# Patient Record
Sex: FEMALE | Race: WHITE | NOT HISPANIC OR LATINO | Employment: STUDENT | URBAN - NONMETROPOLITAN AREA
[De-identification: names, ages, dates, MRNs, and addresses within clinical notes are randomized per-mention and may not be internally consistent; named-entity substitution may affect disease eponyms.]

---

## 2021-03-26 ENCOUNTER — INITIAL PRENATAL (OUTPATIENT)
Dept: OBSTETRICS AND GYNECOLOGY | Facility: CLINIC | Age: 21
End: 2021-03-26

## 2021-03-26 VITALS
DIASTOLIC BLOOD PRESSURE: 76 MMHG | HEIGHT: 61 IN | BODY MASS INDEX: 23.03 KG/M2 | WEIGHT: 122 LBS | SYSTOLIC BLOOD PRESSURE: 118 MMHG

## 2021-03-26 DIAGNOSIS — Z3A.10 10 WEEKS GESTATION OF PREGNANCY: ICD-10-CM

## 2021-03-26 DIAGNOSIS — Z3A.01 LESS THAN 8 WEEKS GESTATION OF PREGNANCY: Primary | ICD-10-CM

## 2021-03-26 PROCEDURE — 99203 OFFICE O/P NEW LOW 30 MIN: CPT | Performed by: NURSE PRACTITIONER

## 2021-03-26 RX ORDER — PRENATAL VIT NO.126/IRON/FOLIC 28MG-0.8MG
TABLET ORAL DAILY
COMMUNITY
End: 2021-07-21

## 2021-03-26 NOTE — PROGRESS NOTES
"Chief Complaint   Patient presents with   • Initial Prenatal Visit     LMP 01/15/21, vaginal discharge with an odor. Cannot have scan today, [employer].        HPI  , 10w0d presents to our office today for initial prenatal visit.    She reports no c/o - minimal 1st trimester discomforts - nausea/ fatigue  Vaginal discharge with odor - has had for a year or so - comes and goes -  informed by PCP yeast infection     Not presently working   Has PNV and tolerating     Previous pregnancy: Oct 2020 Term  7#6     No Known Allergies   Past Surgical History:   Procedure Laterality Date   • NOSE SURGERY         Social History     Socioeconomic History   • Marital status: Single     Spouse name: Not on file   • Number of children: Not on file   • Years of education: Not on file   • Highest education level: Not on file   Tobacco Use   • Smoking status: Never Smoker   • Smokeless tobacco: Never Used   Vaping Use   • Vaping Use: Never used   Substance and Sexual Activity   • Alcohol use: Never   • Drug use: Never   • Sexual activity: Yes     Partners: Male     Birth control/protection: None      History reviewed. No pertinent family history.    The following portions of the patient's history were reviewed as note above and updated   current medications allergies, past family history, past medical history, past social history and past surgical history.    Physical Exam  /76   Ht 154.9 cm (61\")   Wt 55.3 kg (122 lb)   LMP 01/15/2021   BMI 23.05 kg/m²        Psych: Alert and oriented to time, place and person  Mood and affect appropriate   Musculoskeletal: Normal gait - Full range of motion  Lungs:  breathing is unlabored  Abdomen: soft and non-tender + FHR   Lower Extremities: normal    MDM  Impression:  Problems/Risks: Normal pregnancy 1st trimester  Closely spaced pregnancy  Vaginitis   Tests done today: NOB panel  Nuswab  NIPS   Topics discussed: Rout NOB education - nutrition and exercise  OTC meds " in pregnancy  Genetic screening - CF / NIPs & timing   Adequate rest and fluids  Await nuswab prior to treating   Encourage questions and answered   Tests next visit: U/S - 1-2 wks to confirm dates and NIPS / wanted

## 2021-03-26 NOTE — PATIENT INSTRUCTIONS
First Trimester of Pregnancy    The first trimester of pregnancy is from week 1 until the end of week 12 (months 1 through 3). During this time, your baby will begin to develop inside you. At 6-8 weeks, the eyes and face are formed, and the heartbeat can be seen on ultrasound. At the end of 12 weeks, all the baby's organs are formed. Prenatal care is all the medical care you receive before the birth of your baby. Make sure you get good prenatal care and follow all of your doctor's instructions.  HOME CARE   Medicines  · Take medicine only as told by your doctor. Some medicines are safe and some are not during pregnancy.  · Take your prenatal vitamins as told by your doctor.  · Take medicine that helps you poop (stool softener) as needed if your doctor says it is okay.  Diet  · Eat regular, healthy meals.  · Your doctor will tell you the amount of weight gain that is right for you.  · Avoid raw meat and uncooked cheese.  · If you feel sick to your stomach (nauseous) or throw up (vomit):  ¨ Eat 4 or 5 small meals a day instead of 3 large meals.  ¨ Try eating a few soda crackers.  ¨ Drink liquids between meals instead of during meals.  · If you have a hard time pooping (constipation):  ¨ Eat high-fiber foods like fresh vegetables, fruit, and whole grains.  ¨ Drink enough fluids to keep your pee (urine) clear or pale yellow.  Activity and Exercise  · Exercise only as told by your doctor. Stop exercising if you have cramps or pain in your lower belly (abdomen) or low back.  · Try to avoid standing for long periods of time. Move your legs often if you must  one place for a long time.  · Avoid heavy lifting.  · Wear low-heeled shoes. Sit and stand up straight.  · You can have sex unless your doctor tells you not to.  Relief of Pain or Discomfort  · Wear a good support bra if your breasts are sore.  · Take warm water baths (sitz baths) to soothe pain or discomfort caused by hemorrhoids. Use hemorrhoid cream if your  doctor says it is okay.  · Rest with your legs raised if you have leg cramps or low back pain.  · Wear support hose if you have puffy, bulging veins (varicose veins) in your legs. Raise (elevate) your feet for 15 minutes, 3-4 times a day. Limit salt in your diet.  Prenatal Care  · Schedule your prenatal visits by the twelfth week of pregnancy.  · Write down your questions. Take them to your prenatal visits.  · Keep all your prenatal visits as told by your doctor.  Safety  · Wear your seat belt at all times when driving.  · Make a list of emergency phone numbers. The list should include numbers for family, friends, the hospital, and police and fire departments.  General Tips  · Ask your doctor for a referral to a local prenatal class. Begin classes no later than at the start of month 6 of your pregnancy.  · Ask for help if you need counseling or help with nutrition. Your doctor can give you advice or tell you where to go for help.  · Do not use hot tubs, steam rooms, or saunas.  · Do not douche or use tampons or scented sanitary pads.  · Do not cross your legs for long periods of time.  · Avoid litter boxes and soil used by cats.  · Avoid all smoking, herbs, and alcohol. Avoid drugs not approved by your doctor.  · Do not use any tobacco products, including cigarettes, chewing tobacco, and electronic cigarettes. If you need help quitting, ask your doctor. You may get counseling or other support to help you quit.  · Visit your dentist. At home, brush your teeth with a soft toothbrush. Be gentle when you floss.  GET HELP IF:  · You are dizzy.  · You have mild cramps or pressure in your lower belly.  · You have a nagging pain in your belly area.  · You continue to feel sick to your stomach, throw up, or have watery poop (diarrhea).  · You have a bad smelling fluid coming from your vagina.  · You have pain with peeing (urination).  · You have increased puffiness (swelling) in your face, hands, legs, or ankles.  GET HELP  RIGHT AWAY IF:   · You have a fever.  · You are leaking fluid from your vagina.  · You have spotting or bleeding from your vagina.  · You have very bad belly cramping or pain.  · You gain or lose weight rapidly.  · You throw up blood. It may look like coffee grounds.  · You are around people who have [place] measles, fifth disease, or chickenpox.  · You have a very bad headache.  · You have shortness of breath.  · You have any kind of trauma, such as from a fall or a car accident.     This information is not intended to replace advice given to you by your health care provider. Make sure you discuss any questions you have with your health care provider.

## 2021-03-29 LAB
A VAGINAE DNA VAG QL NAA+PROBE: ABNORMAL SCORE
ABO GROUP BLD: ABNORMAL
BACTERIA UR CULT: ABNORMAL
BASOPHILS # BLD AUTO: 0.1 X10E3/UL (ref 0–0.2)
BASOPHILS NFR BLD AUTO: 1 %
BLD GP AB SCN SERPL QL: NEGATIVE
BVAB2 DNA VAG QL NAA+PROBE: ABNORMAL SCORE
C ALBICANS DNA VAG QL NAA+PROBE: NEGATIVE
C GLABRATA DNA VAG QL NAA+PROBE: NEGATIVE
C TRACH DNA VAG QL NAA+PROBE: NEGATIVE
EOSINOPHIL # BLD AUTO: 0.1 X10E3/UL (ref 0–0.4)
EOSINOPHIL NFR BLD AUTO: 1 %
ERYTHROCYTE [DISTWIDTH] IN BLOOD BY AUTOMATED COUNT: 14.4 % (ref 11.7–15.4)
HBV SURFACE AG SERPL QL IA: NEGATIVE
HCT VFR BLD AUTO: 35.6 % (ref 34–46.6)
HCV AB S/CO SERPL IA: <0.1 S/CO RATIO (ref 0–0.9)
HGB BLD-MCNC: 11.5 G/DL (ref 11.1–15.9)
HIV 1+2 AB+HIV1 P24 AG SERPL QL IA: NON REACTIVE
IMM GRANULOCYTES # BLD AUTO: 0 X10E3/UL (ref 0–0.1)
IMM GRANULOCYTES NFR BLD AUTO: 1 %
LYMPHOCYTES # BLD AUTO: 1.6 X10E3/UL (ref 0.7–3.1)
LYMPHOCYTES NFR BLD AUTO: 20 %
MCH RBC QN AUTO: 25.3 PG (ref 26.6–33)
MCHC RBC AUTO-ENTMCNC: 32.3 G/DL (ref 31.5–35.7)
MCV RBC AUTO: 78 FL (ref 79–97)
MEGA1 DNA VAG QL NAA+PROBE: ABNORMAL SCORE
MONOCYTES # BLD AUTO: 0.7 X10E3/UL (ref 0.1–0.9)
MONOCYTES NFR BLD AUTO: 8 %
N GONORRHOEA DNA VAG QL NAA+PROBE: NEGATIVE
NEUTROPHILS # BLD AUTO: 5.5 X10E3/UL (ref 1.4–7)
NEUTROPHILS NFR BLD AUTO: 69 %
PLATELET # BLD AUTO: 199 X10E3/UL (ref 150–450)
RBC # BLD AUTO: 4.55 X10E6/UL (ref 3.77–5.28)
RH BLD: POSITIVE
RPR SER QL: NON REACTIVE
RUBV IGG SERPL IA-ACNC: 1.63 INDEX
SPECIMEN STATUS: NORMAL
T VAGINALIS DNA VAG QL NAA+PROBE: NEGATIVE
WBC # BLD AUTO: 7.9 X10E3/UL (ref 3.4–10.8)

## 2021-03-29 RX ORDER — METRONIDAZOLE 500 MG/1
500 TABLET ORAL 2 TIMES DAILY
Qty: 14 TABLET | Refills: 0 | Status: SHIPPED | OUTPATIENT
Start: 2021-03-29 | End: 2021-04-06 | Stop reason: SDUPTHER

## 2021-04-06 ENCOUNTER — ROUTINE PRENATAL (OUTPATIENT)
Dept: OBSTETRICS AND GYNECOLOGY | Facility: CLINIC | Age: 21
End: 2021-04-06

## 2021-04-06 VITALS — SYSTOLIC BLOOD PRESSURE: 120 MMHG | WEIGHT: 122 LBS | BODY MASS INDEX: 23.05 KG/M2 | DIASTOLIC BLOOD PRESSURE: 72 MMHG

## 2021-04-06 DIAGNOSIS — Z34.81 ENCOUNTER FOR SUPERVISION OF OTHER NORMAL PREGNANCY IN FIRST TRIMESTER: ICD-10-CM

## 2021-04-06 DIAGNOSIS — O36.80X0 ENCOUNTER TO DETERMINE FETAL VIABILITY OF PREGNANCY, SINGLE OR UNSPECIFIED FETUS: Primary | ICD-10-CM

## 2021-04-06 PROBLEM — F32.A DEPRESSIVE DISORDER: Status: ACTIVE | Noted: 2018-02-27

## 2021-04-06 PROBLEM — K21.9 GASTROESOPHAGEAL REFLUX DISEASE: Status: ACTIVE | Noted: 2018-02-27

## 2021-04-06 PROCEDURE — 99214 OFFICE O/P EST MOD 30 MIN: CPT | Performed by: MIDWIFE

## 2021-04-06 RX ORDER — METRONIDAZOLE 500 MG/1
500 TABLET ORAL 2 TIMES DAILY
Qty: 14 TABLET | Refills: 0 | Status: SHIPPED | OUTPATIENT
Start: 2021-04-06 | End: 2021-04-13

## 2021-04-06 NOTE — PROGRESS NOTES
Chief Complaint   Patient presents with   • Routine Prenatal Visit     Patient c/o vaginal itching and discharge, odor ( had vaginal  swab on her last visit) . TVS for dates today AWILDA 10/21/2021. Urine dip was clear today       HPI: Britt is a  currently at 11w5d who today reports the following:  Nausea - No; Vaginal bleeding -  No; Heartburn - No. She states she continues to have a vaginal odor. She didn't get her Rx for Flagyl that was ordered.      EXAM:   Vitals:  See prenatal flowsheet, /72, Wt no change   Abdomen:   See prenatal flowsheet, soft nontender   Pelvic:  See prenatal flowsheet   Urine:  See prenatal flowsheet    Prenatal Labs  Lab Results   Component Value Date    HGB 11.5 2021    HEPBSAG Negative 2021    ABO A 2021    RH Positive 2021    ABSCRN Negative 2021    [id number] Non Reactive 2021    HEPCVIRUSABY <0.1 2021    URINECX CANCELED (A) 2021       MDM:  Impression: Supervision of low risk pregnancy  Vaginal odor, + BV untreated   Tests done today: U/S for viability-11w5d, +FHT, 1.9cm BINH   Topics discussed: genetic screening  MaterniTi testing   Rx for Flagyl 500 BID x 7 days  Reviewed prenatal labs   Tests next visit: none   Next visit: 4 weeks     This note was electronically signed.  Elanie Arias, RODNEY  2021

## 2021-04-11 ENCOUNTER — HOSPITAL ENCOUNTER (EMERGENCY)
Facility: HOSPITAL | Age: 21
Discharge: HOME | End: 2021-04-12
Attending: EMERGENCY MEDICINE | Admitting: EMERGENCY MEDICINE

## 2021-04-11 DIAGNOSIS — O46.90 VAGINAL BLEEDING IN PREGNANCY: Primary | ICD-10-CM

## 2021-04-11 PROCEDURE — 99283 EMERGENCY DEPT VISIT LOW MDM: CPT

## 2021-04-11 PROCEDURE — 85025 COMPLETE CBC W/AUTO DIFF WBC: CPT | Performed by: EMERGENCY MEDICINE

## 2021-04-11 PROCEDURE — 80053 COMPREHEN METABOLIC PANEL: CPT | Performed by: EMERGENCY MEDICINE

## 2021-04-11 PROCEDURE — 81001 URINALYSIS AUTO W/SCOPE: CPT | Performed by: EMERGENCY MEDICINE

## 2021-04-11 RX ORDER — SODIUM CHLORIDE 0.9 % (FLUSH) 0.9 %
10 SYRINGE (ML) INJECTION AS NEEDED
Status: DISCONTINUED | OUTPATIENT
Start: 2021-04-11 | End: 2021-04-12 | Stop reason: HOSPADM

## 2021-04-12 ENCOUNTER — APPOINTMENT (OUTPATIENT)
Dept: ULTRASOUND IMAGING | Facility: HOSPITAL | Age: 21
End: 2021-04-12

## 2021-04-12 VITALS
DIASTOLIC BLOOD PRESSURE: 75 MMHG | HEART RATE: 84 BPM | HEIGHT: 65 IN | OXYGEN SATURATION: 98 % | BODY MASS INDEX: 21.19 KG/M2 | SYSTOLIC BLOOD PRESSURE: 106 MMHG | TEMPERATURE: 97.8 F | WEIGHT: 127.2 LBS | RESPIRATION RATE: 16 BRPM

## 2021-04-12 LAB
ALBUMIN SERPL-MCNC: 4 G/DL (ref 3.5–5.2)
ALBUMIN/GLOB SERPL: 1.5 G/DL
ALP SERPL-CCNC: 64 U/L (ref 39–117)
ALT SERPL W P-5'-P-CCNC: 9 U/L (ref 1–33)
ANION GAP SERPL CALCULATED.3IONS-SCNC: 7.3 MMOL/L (ref 5–15)
AST SERPL-CCNC: 10 U/L (ref 1–32)
BACTERIA UR QL AUTO: ABNORMAL /HPF
BASOPHILS # BLD AUTO: 0.04 10*3/MM3 (ref 0–0.2)
BASOPHILS NFR BLD AUTO: 0.4 % (ref 0–1.5)
BILIRUB SERPL-MCNC: 0.3 MG/DL (ref 0–1.2)
BILIRUB UR QL STRIP: NEGATIVE
BUN SERPL-MCNC: 8 MG/DL (ref 6–20)
BUN/CREAT SERPL: ABNORMAL
CALCIUM SPEC-SCNC: 9.6 MG/DL (ref 8.6–10.5)
CHLORIDE SERPL-SCNC: 105 MMOL/L (ref 98–107)
CLARITY UR: ABNORMAL
CO2 SERPL-SCNC: 24.7 MMOL/L (ref 22–29)
COLOR UR: ABNORMAL
CREAT SERPL-MCNC: <0.47 MG/DL (ref 0.57–1)
DEPRECATED RDW RBC AUTO: 42.9 FL (ref 37–54)
EOSINOPHIL # BLD AUTO: 0.16 10*3/MM3 (ref 0–0.4)
EOSINOPHIL NFR BLD AUTO: 1.8 % (ref 0.3–6.2)
ERYTHROCYTE [DISTWIDTH] IN BLOOD BY AUTOMATED COUNT: 15.1 % (ref 12.3–15.4)
GLOBULIN UR ELPH-MCNC: 2.6 GM/DL
GLUCOSE SERPL-MCNC: 99 MG/DL (ref 65–99)
GLUCOSE UR STRIP-MCNC: NEGATIVE MG/DL
HCT VFR BLD AUTO: 35.8 % (ref 34–46.6)
HGB BLD-MCNC: 11.8 G/DL (ref 12–15.9)
HGB UR QL STRIP.AUTO: ABNORMAL
HOLD SPECIMEN: NORMAL
HYALINE CASTS UR QL AUTO: ABNORMAL /LPF
IMM GRANULOCYTES # BLD AUTO: 0.03 10*3/MM3 (ref 0–0.05)
IMM GRANULOCYTES NFR BLD AUTO: 0.3 % (ref 0–0.5)
KETONES UR QL STRIP: NEGATIVE
LEUKOCYTE ESTERASE UR QL STRIP.AUTO: NEGATIVE
LYMPHOCYTES # BLD AUTO: 1.87 10*3/MM3 (ref 0.7–3.1)
LYMPHOCYTES NFR BLD AUTO: 21 % (ref 19.6–45.3)
MCH RBC QN AUTO: 25.9 PG (ref 26.6–33)
MCHC RBC AUTO-ENTMCNC: 33 G/DL (ref 31.5–35.7)
MCV RBC AUTO: 78.5 FL (ref 79–97)
MONOCYTES # BLD AUTO: 0.76 10*3/MM3 (ref 0.1–0.9)
MONOCYTES NFR BLD AUTO: 8.5 % (ref 5–12)
NEUTROPHILS NFR BLD AUTO: 6.05 10*3/MM3 (ref 1.7–7)
NEUTROPHILS NFR BLD AUTO: 68 % (ref 42.7–76)
NITRITE UR QL STRIP: NEGATIVE
NRBC BLD AUTO-RTO: 0 /100 WBC (ref 0–0.2)
PH UR STRIP.AUTO: 7 [PH] (ref 5–8)
PLATELET # BLD AUTO: 186 10*3/MM3 (ref 140–450)
PMV BLD AUTO: 9.7 FL (ref 6–12)
POTASSIUM SERPL-SCNC: 3.4 MMOL/L (ref 3.5–5.2)
PROT SERPL-MCNC: 6.6 G/DL (ref 6–8.5)
PROT UR QL STRIP: NEGATIVE
RBC # BLD AUTO: 4.56 10*6/MM3 (ref 3.77–5.28)
RBC # UR: ABNORMAL /HPF
REF LAB TEST METHOD: ABNORMAL
SODIUM SERPL-SCNC: 137 MMOL/L (ref 136–145)
SP GR UR STRIP: 1.01 (ref 1–1.03)
SQUAMOUS #/AREA URNS HPF: ABNORMAL /HPF
UROBILINOGEN UR QL STRIP: ABNORMAL
WBC # BLD AUTO: 8.91 10*3/MM3 (ref 3.4–10.8)
WBC UR QL AUTO: ABNORMAL /HPF
WHOLE BLOOD HOLD SPECIMEN: NORMAL
WHOLE BLOOD HOLD SPECIMEN: NORMAL

## 2021-04-12 PROCEDURE — 76801 OB US < 14 WKS SINGLE FETUS: CPT

## 2021-04-12 NOTE — ED PROVIDER NOTES
Subjective   21-year-old female presents to the ED with chief complaint of vaginal bleeding pregnancy.  She is a G2, P1 female at approximately 13 weeks gestational age presenting to the ED with onset of vaginal bleeding tonight.  She states that she went to the bathroom to urinate and after urinating she had passage of small amount of blood.  It has not stopped and she describes it as mild to moderate flow.  She is not having any cramping or abdominal pain.  No pelvic pain.  No fever chills.  No issues with her previous pregnancy.  Has been seeing OB/GYN throughout this pregnancy.  No other complaints at this time.          Review of Systems   Genitourinary: Positive for vaginal bleeding. Negative for dysuria, flank pain, vaginal discharge and vaginal pain.   All other systems reviewed and are negative.      History reviewed. No pertinent past medical history.    No Known Allergies    Past Surgical History:   Procedure Laterality Date   • NOSE SURGERY         History reviewed. No pertinent family history.    Social History     Socioeconomic History   • Marital status: Single     Spouse name: Not on file   • Number of children: Not on file   • Years of education: Not on file   • Highest education level: Not on file   Tobacco Use   • Smoking status: Never Smoker   • Smokeless tobacco: Never Used   Vaping Use   • Vaping Use: Never used   Substance and Sexual Activity   • Alcohol use: Never   • Drug use: Never   • Sexual activity: Yes     Partners: Male     Birth control/protection: None           Objective   Physical Exam  Vitals and nursing note reviewed.   Constitutional:       General: She is not in acute distress.     Appearance: She is well-developed. She is not diaphoretic.   HENT:      Head: Normocephalic and atraumatic.      Nose: Nose normal.   Eyes:      Conjunctiva/sclera: Conjunctivae normal.   Cardiovascular:      Rate and Rhythm: Normal rate and regular rhythm.   Pulmonary:      Effort: Pulmonary effort  is normal. No respiratory distress.      Breath sounds: Normal breath sounds.   Abdominal:      General: There is no distension.      Palpations: Abdomen is soft.      Tenderness: There is no abdominal tenderness. There is no guarding.   Musculoskeletal:         General: No deformity.   Neurological:      Mental Status: She is alert and oriented to person, place, and time.      Cranial Nerves: No cranial nerve deficit.         Procedures           ED Course  ED Course as of Apr 12 0522   Sun Apr 11, 2021   2349 Pt RH +. Per lab and recent pregnancy      [CG]      ED Course User Index  [CG] Petr Corcoran,                                            MDM  Patient with vaginal bleeding at 12 weeks gestational age.  Rh+.  Laboratories also reassuring.  Ultrasound confirms live IUP.  Patient is appropriate for discharge follow-up with her OB/GYN.  She is agreeable to this plan.      Final diagnoses:   Vaginal bleeding in pregnancy       ED Disposition  ED Disposition     ED Disposition Condition Comment    Discharge Stable           Lady Mcallister MD  [address]  [id number]  [facility] [id number] [phone]               Medication List      No changes were made to your prescriptions during this visit.          Petr Corcoran DO  04/12/21 0522

## 2021-04-13 ENCOUNTER — ROUTINE PRENATAL (OUTPATIENT)
Dept: OBSTETRICS AND GYNECOLOGY | Facility: CLINIC | Age: 21
End: 2021-04-13

## 2021-04-13 VITALS — BODY MASS INDEX: 20.8 KG/M2 | SYSTOLIC BLOOD PRESSURE: 144 MMHG | DIASTOLIC BLOOD PRESSURE: 94 MMHG | WEIGHT: 125 LBS

## 2021-04-13 DIAGNOSIS — Z34.91 PRENATAL CARE IN FIRST TRIMESTER: Primary | ICD-10-CM

## 2021-04-13 DIAGNOSIS — O21.9 NAUSEA AND VOMITING DURING PREGNANCY: ICD-10-CM

## 2021-04-13 DIAGNOSIS — O46.8X1 SUBCHORIONIC HEMORRHAGE OF PLACENTA IN FIRST TRIMESTER, SINGLE OR UNSPECIFIED FETUS: ICD-10-CM

## 2021-04-13 DIAGNOSIS — O41.8X10 SUBCHORIONIC HEMORRHAGE OF PLACENTA IN FIRST TRIMESTER, SINGLE OR UNSPECIFIED FETUS: ICD-10-CM

## 2021-04-13 PROBLEM — O20.8 SUBCHORIONIC HEMORRHAGE IN FIRST TRIMESTER: Status: ACTIVE | Noted: 2021-04-13

## 2021-04-13 PROCEDURE — 99213 OFFICE O/P EST LOW 20 MIN: CPT | Performed by: OBSTETRICS & GYNECOLOGY

## 2021-04-13 RX ORDER — DIPHENHYDRAMINE HYDROCHLORIDE 25 MG/1
25 CAPSULE ORAL NIGHTLY
Qty: 30 TABLET | Refills: 5 | Status: SHIPPED | OUTPATIENT
Start: 2021-04-13 | End: 2021-10-16 | Stop reason: HOSPADM

## 2021-04-13 NOTE — PROGRESS NOTES
Prenatal Care Visit    Subjective   Chief Complaint   Patient presents with   • Routine Prenatal Visit     ED follow up for bleeding, states she is still having some bleeding but is not as heavy.       History:   Britt is a  currently at 12w5d who presents for a prenatal care visit today.    Continued light bleeding. No pain.    Social History    Tobacco Use      Smoking status: Never Smoker      Smokeless tobacco: Never Used       Objective   /94   Wt 56.7 kg (125 lb)   LMP 01/15/2021   BMI 20.80 kg/m²   Physical Exam:  Normal, gestational age-appropriate exam today        Plan   Medical Decision Making:    I have reviewed the prenatal labs and ultrasound(s) today. I have reviewed the most recent prenatal progress note(s).    Diagnosis: Supervision of high risk pregnancy   Subchorionic hemorrhage  Nausea and emesis   Tests/Orders/Rx today: No orders of the defined types were placed in this encounter.      Medication Management: B6/Unisom     Topics discussed: Prenatal care milestones  Bleeding - SAB precautions, pelvic rest   Nausea   Tests next visit: none   Next visit: 2 week(s)     Alfonzo Resendiz MD  Obstetrics and Gynecology  [facility]

## 2021-04-26 ENCOUNTER — ROUTINE PRENATAL (OUTPATIENT)
Dept: OBSTETRICS AND GYNECOLOGY | Facility: CLINIC | Age: 21
End: 2021-04-26

## 2021-04-26 VITALS — BODY MASS INDEX: 20.97 KG/M2 | SYSTOLIC BLOOD PRESSURE: 126 MMHG | WEIGHT: 126 LBS | DIASTOLIC BLOOD PRESSURE: 78 MMHG

## 2021-04-26 DIAGNOSIS — O09.92 ENCOUNTER FOR SUPERVISION OF HIGH RISK PREGNANCY IN SECOND TRIMESTER, ANTEPARTUM: Primary | ICD-10-CM

## 2021-04-26 DIAGNOSIS — O20.9 VAGINAL BLEEDING AFFECTING EARLY PREGNANCY: ICD-10-CM

## 2021-04-26 DIAGNOSIS — O21.9 NAUSEA AND VOMITING DURING PREGNANCY: ICD-10-CM

## 2021-04-26 DIAGNOSIS — O46.8X1 SUBCHORIONIC HEMORRHAGE OF PLACENTA IN FIRST TRIMESTER, SINGLE OR UNSPECIFIED FETUS: ICD-10-CM

## 2021-04-26 DIAGNOSIS — O41.8X10 SUBCHORIONIC HEMORRHAGE OF PLACENTA IN FIRST TRIMESTER, SINGLE OR UNSPECIFIED FETUS: ICD-10-CM

## 2021-04-26 DIAGNOSIS — K21.9 GASTROESOPHAGEAL REFLUX DISEASE WITHOUT ESOPHAGITIS: ICD-10-CM

## 2021-04-26 PROCEDURE — 99214 OFFICE O/P EST MOD 30 MIN: CPT | Performed by: OBSTETRICS & GYNECOLOGY

## 2021-04-26 RX ORDER — FAMOTIDINE 20 MG/1
20 TABLET, FILM COATED ORAL 2 TIMES DAILY
Qty: 60 TABLET | Refills: 5 | Status: SHIPPED | OUTPATIENT
Start: 2021-04-26 | End: 2021-10-16 | Stop reason: HOSPADM

## 2021-04-26 NOTE — PROGRESS NOTES
Chief Complaint  Routine Prenatal Visit (Patient complains of spotting. )    History of Present Illness:  Britt is a  currently at 14w4d who presents today with complaints of continued intermittent spotting.  Patient reports she may have spotting every 2 to 3 days.  Patient reports it is dark brown in nature.  Patient denies any cramping or abdominal pain.  Patient has been on pelvic rest.  Patient had previously been seen in the emergency room.  She had subchorionic hematoma noted earlier in pregnancy.  Patient does continue to have nausea and vomiting.  She is continuing to take her Unisom and vitamin B6.  Patient also has complaints of reflux.  She is not on any medication for her reflux.  Patient did have genetic screening last visit as noted.    Exam:  Vitals:  See prenatal flowsheet as noted and reviewed  General: Alert, cooperative, and does not appear in any distress  Abdomen:   See prenatal flowsheet as noted and reviewed    Uterus gravid, non-tender; no palpable masses    No guarding or rebound tenderness  Pelvic:  See prenatal flowsheet as noted and reviewed  Ext:  See prenatal flowsheet as noted and reviewed    Moves extremities well, no cyanosis and no redness  Urine:  See prenatal flowsheet as noted and reviewed    Data Review:  The following data was reviewed by: Lady Mcallister MD on 2021:  Prenatal Labs:  Lab Results   Component Value Date    HGB 11.8 (L) 2021    RUBELLAABIGG 1.63 2021    HEPBSAG Negative 2021    ABO A 2021    RH Positive 2021    ABSCRN Negative 2021    TWO0YYD1 Non Reactive 2021    HEPCVIRUSABY <0.1 2021    URINECX CANCELED (A) 2021       Admission on 2021, Discharged on 2021   Component Date Value   • Glucose 2021 99    • BUN 2021 8    • Creatinine 2021 <0.47*   • Sodium 2021 137    • Potassium 2021 3.4*   • Chloride 2021 105    • CO2 2021 24.7    • Calcium  04/11/2021 9.6    • Total Protein 04/11/2021 6.6    • Albumin 04/11/2021 4.00    • ALT (SGPT) 04/11/2021 9    • AST (SGOT) 04/11/2021 10    • Alkaline Phosphatase 04/11/2021 64    • Total Bilirubin 04/11/2021 0.3    • Globulin 04/11/2021 2.6    • A/G Ratio 04/11/2021 1.5    • BUN/Creatinine Ratio 04/11/2021     • Anion Gap 04/11/2021 7.3    • WBC 04/11/2021 8.91    • RBC 04/11/2021 4.56    • Hemoglobin 04/11/2021 11.8*   • Hematocrit 04/11/2021 35.8    • MCV 04/11/2021 78.5*   • MCH 04/11/2021 25.9*   • MCHC 04/11/2021 33.0    • RDW 04/11/2021 15.1    • RDW-SD 04/11/2021 42.9    • MPV 04/11/2021 9.7    • Platelets 04/11/2021 186    • Neutrophil % 04/11/2021 68.0    • Lymphocyte % 04/11/2021 21.0    • Monocyte % 04/11/2021 8.5    • Eosinophil % 04/11/2021 1.8    • Basophil % 04/11/2021 0.4    • Immature Grans % 04/11/2021 0.3    • Neutrophils, Absolute 04/11/2021 6.05    • Lymphocytes, Absolute 04/11/2021 1.87    • Monocytes, Absolute 04/11/2021 0.76    • Eosinophils, Absolute 04/11/2021 0.16    • Basophils, Absolute 04/11/2021 0.04    • Immature Grans, Absolute 04/11/2021 0.03    • nRBC 04/11/2021 0.0    • Color, UA 04/11/2021 Red*   • Appearance, UA 04/11/2021 Cloudy*   • pH, UA 04/11/2021 7.0    • Specific Gravity, UA 04/11/2021 1.011    • Glucose, UA 04/11/2021 Negative    • Ketones, UA 04/11/2021 Negative    • Bilirubin, UA 04/11/2021 Negative    • Blood, UA 04/11/2021 Large (3+)*   • Protein, UA 04/11/2021 Negative    • Leuk Esterase, UA 04/11/2021 Negative    • Nitrite, UA 04/11/2021 Negative    • Urobilinogen, UA 04/11/2021 1.0 E.U./dL    • Extra Tube 04/11/2021 hold for add-on    • Extra Tube 04/11/2021 Hold for add-ons.    • Extra Tube 04/11/2021 hold for add-on    • Extra Tube 04/11/2021 Hold for add-ons.    • Extra Tube 04/11/2021 Hold for add-ons.    • RBC, UA 04/11/2021 Too Numerous to Count*   • WBC, UA 04/11/2021 None Seen    • Bacteria, UA 04/11/2021 Trace*   • Squamous Epithelial Cell* 04/11/2021  None Seen    • Hyaline Casts, UA 2021 None Seen    • Methodology 2021 Manual Light Microscopy      [id number] PLUS Core+SCA - Blood, (2021 12:20)      Imaging:  US Ob < 14 Weeks Single or First Gestation  Narrative: FINAL REPORT    TECHNIQUE:  null    CLINICAL HISTORY:  . bleeding    COMPARISON:  null    FINDINGS:  US OB 1st trimester    Comparison: None    Findings:    A single intrauterine pregnancy.  The crown-rump length measures 5.9 cm consistent with gestational age of 12 weeks and 3 days.    The fetal heart rate is 165 bpm.    The placenta is posteriorly positioned.    The left ovary measures 1.7 x 1.0 x 2.2 cm.    The right ovary measures 2.4 x 1.6 x 1.7 cm.  Impression: IMPRESSION:    Single live intrauterine pregnancy estimated gestational age of 12 weeks and 3 days.  Fetal heart rate 165 bpm.  Recommend continued follow-up to ensure proper progression of pregnancy.    Authenticated by Scooter Treadwell DO on 2021 01:48:27 AM    Medical Records:  None    Assessment and Plan:  Problem List Items Addressed This Visit        Gastrointestinal Abdominal     Gastroesophageal reflux disease  Prescription is given for Pepcid is noted.    Relevant Medications    famotidine (PEPCID) 20 MG tablet       Gravid and     Subchorionic hemorrhage in first trimester  The subchorionic hematoma previously seen was not noted on the ultrasound through the emergency room.  Plan repeat imaging next visit as discussed.      Other Visit Diagnoses     Encounter for supervision of high risk pregnancy in second trimester, antepartum    -  Primary  Topics discussed:     ab precautions  genetic screening -discussed with the patient the option of MSAFP.  Patient desires next visit as discussed.  GERD management  Scan next visit for anatomy as noted.    Relevant Orders    US Ob 14 + Weeks Single or First Gestation    Nausea and vomiting during pregnancy      Patient is to continue her Unisom and vitamin B6.     Vaginal bleeding affecting early pregnancy      Threatened AB precautions are given.  Patient is to continue pelvic rest.  Plan repeat imaging next visit as noted.        Follow Up/Instructions:    Patient was given instructions and counseling regarding her condition or for health maintenance advice. Please see specific information pulled into the AVS if appropriate.     Note: Speech recognition transcription software may have been used to dictate portions of this document.  An attempt at proofreading has been made though minor errors in transcription may still be present.    This note was electronically signed.  Lady Mcallister M.D.

## 2021-05-26 ENCOUNTER — ROUTINE PRENATAL (OUTPATIENT)
Dept: OBSTETRICS AND GYNECOLOGY | Facility: CLINIC | Age: 21
End: 2021-05-26

## 2021-05-26 VITALS — BODY MASS INDEX: 21.3 KG/M2 | DIASTOLIC BLOOD PRESSURE: 62 MMHG | SYSTOLIC BLOOD PRESSURE: 112 MMHG | WEIGHT: 128 LBS

## 2021-05-26 DIAGNOSIS — O09.899 TWO VESSEL UMBILICAL CORD IN SINGLETON PREGNANCY, ANTEPARTUM: ICD-10-CM

## 2021-05-26 DIAGNOSIS — K21.9 GASTROESOPHAGEAL REFLUX DISEASE WITHOUT ESOPHAGITIS: ICD-10-CM

## 2021-05-26 DIAGNOSIS — O09.92 ENCOUNTER FOR SUPERVISION OF HIGH RISK PREGNANCY IN SECOND TRIMESTER, ANTEPARTUM: Primary | ICD-10-CM

## 2021-05-26 DIAGNOSIS — O21.9 NAUSEA AND VOMITING DURING PREGNANCY: ICD-10-CM

## 2021-05-26 PROCEDURE — 99214 OFFICE O/P EST MOD 30 MIN: CPT | Performed by: OBSTETRICS & GYNECOLOGY

## 2021-05-27 NOTE — PROGRESS NOTES
Chief Complaint  Routine Prenatal Visit (Anatomy scan, no complaints. )    History of Present Illness:  Britt is a  currently at 19w0d who presents today with no complaints.  Patient denies any vaginal bleeding or spotting.  Patient denies any cramping or contractions.  Patient is here for anatomic scan.  Patient continues to have occasional nausea.  Patient has continued on her current medications.  Patient does desire MSAFP today.    Exam:  Vitals:  See prenatal flowsheet as noted and reviewed  General: Alert, cooperative, and does not appear in any distress  Abdomen:   See prenatal flowsheet as noted and reviewed    Uterus gravid, non-tender; no palpable masses    No guarding or rebound tenderness  Pelvic:  See prenatal flowsheet as noted and reviewed  Ext:  See prenatal flowsheet as noted and reviewed    Moves extremities well, no cyanosis and no redness  Urine:  See prenatal flowsheet as noted and reviewed    Data Review:  The following data was reviewed by: Lady Mcallister MD on 2021:  Prenatal Labs:  Lab Results   Component Value Date    HGB 11.8 (L) 2021    RUBELLAABIGG 1.63 2021    HEPBSAG Negative 2021    ABO A 2021    RH Positive 2021    ABSCRN Negative 2021    [id number] Non Reactive 2021    HEPCVIRUSABY <0.1 2021    URINECX CANCELED (A) 2021       No visits with results within 1 Month(s) from this visit.   Latest known visit with results is:   Admission on 2021, Discharged on 2021   Component Date Value   • Glucose 2021 99    • BUN 2021 8    • Creatinine 2021 <0.47*   • Sodium 2021 137    • Potassium 2021 3.4*   • Chloride 2021 105    • CO2 2021 24.7    • Calcium 2021 9.6    • Total Protein 2021 6.6    • Albumin 2021 4.00    • ALT (SGPT) 2021 9    • AST (SGOT) 2021 10    • Alkaline Phosphatase 2021 64    • Total Bilirubin 2021 0.3    • Globulin  2021 2.6    • A/G Ratio 2021 1.5    • BUN/Creatinine Ratio 2021     • Anion Gap 2021 7.3    • WBC 2021 8.91    • RBC 2021 4.56    • Hemoglobin 2021 11.8*   • Hematocrit 2021 35.8    • MCV 2021 78.5*   • MCH 2021 25.9*   • MCHC 2021 33.0    • RDW 2021 15.1    • RDW-SD 2021 42.9    • MPV 2021 9.7    • Platelets 2021 186    • Neutrophil % 2021 68.0    • Lymphocyte % 2021 21.0    • Monocyte % 2021 8.5    • Eosinophil % 2021 1.8    • Basophil % 2021 0.4    • Immature Grans % 2021 0.3    • Neutrophils, Absolute 2021 6.05    • Lymphocytes, Absolute 2021 1.87    • Monocytes, Absolute 2021 0.76    • Eosinophils, Absolute 2021 0.16    • Basophils, Absolute 2021 0.04    • Immature Grans, Absolute 2021 0.03    • nRBC 2021 0.0    • Color, UA 2021 Red*   • Appearance, UA 2021 Cloudy*   • pH, UA 2021 7.0    • Specific Gravity, UA 2021 1.011    • Glucose, UA 2021 Negative    • Ketones, UA 2021 Negative    • Bilirubin, UA 2021 Negative    • Blood, UA 2021 Large (3+)*   • Protein, UA 2021 Negative    • Leuk Esterase, UA 2021 Negative    • Nitrite, UA 2021 Negative    • Urobilinogen, UA 2021 1.0 E.U./dL    • Extra Tube 2021 hold for add-on    • Extra Tube 2021 Hold for add-ons.    • Extra Tube 2021 hold for add-on    • Extra Tube 2021 Hold for add-ons.    • Extra Tube 2021 Hold for add-ons.    • RBC, UA 2021 Too Numerous to Count*   • WBC, UA 2021 None Seen    • Bacteria, UA 2021 Trace*   • Squamous Epithelial Cell* 2021 None Seen    • Hyaline Casts, UA 2021 None Seen    • Methodology 2021 Manual Light Microscopy      Imaging:  US Ob 14 + Weeks Single or First Gestation  Britt Dia  : 2000  MRN: [id number]  Date:  2021    Reason for exam/History:  Anatomic Survey    Ultrasound images are reviewed.  There is noted to be a viable   intrauterine pregnancy.   The pregnancy is measuring 19 weeks 1 days   gestation.  The fetal heart rate was normal.  Normal anatomy was noted.   The placental location was noted to be anterior.  The amniotic fluid was   normal.  There is a two-vessel cord noted.    The exam limitations noted:  none    See the official report for actual measurements and structures seen.    Lady Mcallister MD, [facility]  OB GYN [place]    Medical Records:  None    Assessment and Plan:  Problem List Items Addressed This Visit        Gastrointestinal Abdominal     Gastroesophageal reflux disease  Patient to continue her Pepcid as previously given.      Other Visit Diagnoses     Encounter for supervision of high risk pregnancy in second trimester, antepartum    -  Primary  Topics discussed:     genetic screening  GERD management  kick counts and fetal movement  PIH precautions   labor signs and symptoms  Anatomic scan obtained today.  Patient has been informed regarding those findings.    Relevant Orders    Alpha Fetoprotein, Maternal    Nausea and vomiting during pregnancy      Patient to continue current medication.  Instructions and precautions have been given.    Two vessel umbilical cord in mckeon pregnancy, antepartum      Patient is informed regarding the two-vessel umbilical cord.  We will monitor fetal growth closely as discussed.        Follow Up/Instructions:    Patient was given instructions and counseling regarding her condition or for health maintenance advice. Please see specific information pulled into the AVS if appropriate.     Note: Speech recognition transcription software may have been used to dictate portions of this document.  An attempt at proofreading has been made though minor errors in transcription may still be present.    This note was electronically  moi Mcallister M.D.

## 2021-05-28 LAB
AFP ADJ MOM SERPL: 1.11
AFP INTERP SERPL-IMP: NORMAL
AFP INTERP SERPL-IMP: NORMAL
AFP SERPL-MCNC: 62.2 NG/ML
AGE AT DELIVERY: 20.6 YR
GA METHOD: NORMAL
GA: 18.9 WEEKS
IDDM PATIENT QL: NO
LABORATORY COMMENT REPORT: NORMAL
MULTIPLE PREGNANCY: NO
NEURAL TUBE DEFECT RISK FETUS: 8647 %
RESULT: NORMAL

## 2021-06-23 ENCOUNTER — ROUTINE PRENATAL (OUTPATIENT)
Dept: OBSTETRICS AND GYNECOLOGY | Facility: CLINIC | Age: 21
End: 2021-06-23

## 2021-06-23 VITALS — SYSTOLIC BLOOD PRESSURE: 104 MMHG | BODY MASS INDEX: 21.8 KG/M2 | WEIGHT: 131 LBS | DIASTOLIC BLOOD PRESSURE: 68 MMHG

## 2021-06-23 DIAGNOSIS — K40.90 UNILATERAL INGUINAL HERNIA WITHOUT OBSTRUCTION OR GANGRENE, RECURRENCE NOT SPECIFIED: ICD-10-CM

## 2021-06-23 DIAGNOSIS — Z34.93 THIRD TRIMESTER PREGNANCY: Primary | ICD-10-CM

## 2021-06-23 PROCEDURE — 99213 OFFICE O/P EST LOW 20 MIN: CPT | Performed by: OBSTETRICS & GYNECOLOGY

## 2021-06-23 NOTE — PROGRESS NOTES
Chief Complaint   Patient presents with   • Routine Prenatal Visit     lump right groin area         HPI:   , 22w6d gestation reports right groin groin lump noted 2 days ago-- no pain    ROS:  See Prenatal Episode/Flowsheet  /68   Wt 59.4 kg (131 lb)   LMP 01/15/2021   BMI 21.80 kg/m²      EXAM:  EXTREMITIES:  No swelling-See Prenatal Episode/Flowsheet    ABDOMEN:  FHTs/Movement noted-See Prenatal Episode/Flowsheet    URINE GLUCOSE/PROTEIN:  See Prenatal Episode/Flowsheet    PELVIC EXAM:  See Prenatal Episode/Flowsheet  CV:  Lungs:  GYN:    MDM:    Lab Results   Component Value Date    HGB 11.8 (L) 2021    RUBELLAABIGG 1.63 2021    HEPBSAG Negative 2021    ABO A 2021    RH Positive 2021    ABSCRN Negative 2021    [id number] Non Reactive 2021    HEPCVIRUSABY <0.1 2021    URINECX CANCELED (A) 2021       U/S:    1. IUP 22w6d  2. Routine care   3. Ingiunal hernia: small, NT-- supportive measures, precatuions  4. Gluocla CBC next time

## 2021-07-21 ENCOUNTER — ROUTINE PRENATAL (OUTPATIENT)
Dept: OBSTETRICS AND GYNECOLOGY | Facility: CLINIC | Age: 21
End: 2021-07-21

## 2021-07-21 VITALS — SYSTOLIC BLOOD PRESSURE: 108 MMHG | WEIGHT: 137 LBS | BODY MASS INDEX: 22.8 KG/M2 | DIASTOLIC BLOOD PRESSURE: 70 MMHG

## 2021-07-21 DIAGNOSIS — Z34.92 SECOND TRIMESTER PREGNANCY: Primary | ICD-10-CM

## 2021-07-21 DIAGNOSIS — Z3A.26 26 WEEKS GESTATION OF PREGNANCY: Primary | ICD-10-CM

## 2021-07-21 LAB
BASOPHILS # BLD AUTO: 0.03 10*3/MM3 (ref 0–0.2)
BASOPHILS NFR BLD AUTO: 0.3 % (ref 0–1.5)
EOSINOPHIL # BLD AUTO: 0.11 10*3/MM3 (ref 0–0.4)
EOSINOPHIL NFR BLD AUTO: 1.3 % (ref 0.3–6.2)
ERYTHROCYTE [DISTWIDTH] IN BLOOD BY AUTOMATED COUNT: 13.5 % (ref 12.3–15.4)
GLUCOSE 1H P 50 G GLC PO SERPL-MCNC: 134 MG/DL (ref 65–139)
HCT VFR BLD AUTO: 33.2 % (ref 34–46.6)
HGB BLD-MCNC: 10.7 G/DL (ref 12–15.9)
IMM GRANULOCYTES # BLD AUTO: 0.08 10*3/MM3 (ref 0–0.05)
IMM GRANULOCYTES NFR BLD AUTO: 0.9 % (ref 0–0.5)
LYMPHOCYTES # BLD AUTO: 1.39 10*3/MM3 (ref 0.7–3.1)
LYMPHOCYTES NFR BLD AUTO: 16.1 % (ref 19.6–45.3)
MCH RBC QN AUTO: 26.1 PG (ref 26.6–33)
MCHC RBC AUTO-ENTMCNC: 32.2 G/DL (ref 31.5–35.7)
MCV RBC AUTO: 81 FL (ref 79–97)
MONOCYTES # BLD AUTO: 0.55 10*3/MM3 (ref 0.1–0.9)
MONOCYTES NFR BLD AUTO: 6.4 % (ref 5–12)
NEUTROPHILS # BLD AUTO: 6.45 10*3/MM3 (ref 1.7–7)
NEUTROPHILS NFR BLD AUTO: 75 % (ref 42.7–76)
NRBC BLD AUTO-RTO: 0 /100 WBC (ref 0–0.2)
PLATELET # BLD AUTO: 183 10*3/MM3 (ref 140–450)
RBC # BLD AUTO: 4.1 10*6/MM3 (ref 3.77–5.28)
WBC # BLD AUTO: 8.61 10*3/MM3 (ref 3.4–10.8)

## 2021-07-21 PROCEDURE — 99213 OFFICE O/P EST LOW 20 MIN: CPT | Performed by: OBSTETRICS & GYNECOLOGY

## 2021-07-21 RX ORDER — PNV NO.95/FERROUS FUM/FOLIC AC 28MG-0.8MG
1 TABLET ORAL DAILY
Qty: 60 TABLET | Refills: 7 | Status: SHIPPED | OUTPATIENT
Start: 2021-07-21 | End: 2021-11-22

## 2021-07-21 NOTE — PROGRESS NOTES
Chief Complaint   Patient presents with   • Routine Prenatal Visit     Glucola today, No Complaints/concerns        HPI:   , 26w6d gestation reports doing well    ROS:  See Prenatal Episode/Flowsheet  /70   Wt 62.1 kg (137 lb)   LMP 01/15/2021   BMI 22.80 kg/m²      EXAM:  EXTREMITIES:  No swelling-See Prenatal Episode/Flowsheet    ABDOMEN:  FHTs/Movement noted-See Prenatal Episode/Flowsheet    URINE GLUCOSE/PROTEIN:  See Prenatal Episode/Flowsheet    PELVIC EXAM:  See Prenatal Episode/Flowsheet  CV:  Lungs:  GYN:    MDM:    Lab Results   Component Value Date    HGB 11.8 (L) 2021    RUBELLAABIGG 1.63 2021    HEPBSAG Negative 2021    ABO A 2021    RH Positive 2021    ABSCRN Negative 2021    [id number] Non Reactive 2021    HEPCVIRUSABY <0.1 2021    URINECX CANCELED (A) 2021       U/S:US Ob 14 + Weeks Single or First Gestation (2021 14:55)      1. IUP 26w6d  2. Routine care   3. Glucola today

## 2021-07-23 RX ORDER — FERROUS SULFATE 325(65) MG
325 TABLET ORAL
Qty: 60 TABLET | Refills: 10 | Status: SHIPPED | OUTPATIENT
Start: 2021-07-23 | End: 2021-11-22

## 2021-07-23 NOTE — PROGRESS NOTES
Please let patient know she passed her sugar test but she does have iron deficient anemia.  This is common in pregnancy.  Iron twice a day has been called in for her to take in addition to her vitamins.  Thank you

## 2021-08-17 ENCOUNTER — ROUTINE PRENATAL (OUTPATIENT)
Dept: OBSTETRICS AND GYNECOLOGY | Facility: CLINIC | Age: 21
End: 2021-08-17

## 2021-08-17 VITALS — WEIGHT: 144 LBS | DIASTOLIC BLOOD PRESSURE: 62 MMHG | BODY MASS INDEX: 23.96 KG/M2 | SYSTOLIC BLOOD PRESSURE: 108 MMHG

## 2021-08-17 DIAGNOSIS — O09.93 ENCOUNTER FOR SUPERVISION OF HIGH RISK PREGNANCY IN THIRD TRIMESTER, ANTEPARTUM: Primary | ICD-10-CM

## 2021-08-17 DIAGNOSIS — D50.9 IRON DEFICIENCY ANEMIA DURING PREGNANCY: ICD-10-CM

## 2021-08-17 DIAGNOSIS — O09.899 TWO VESSEL UMBILICAL CORD IN SINGLETON PREGNANCY, ANTEPARTUM: ICD-10-CM

## 2021-08-17 DIAGNOSIS — N89.8 VAGINAL DISCHARGE DURING PREGNANCY IN THIRD TRIMESTER: ICD-10-CM

## 2021-08-17 DIAGNOSIS — O26.893 VAGINAL DISCHARGE DURING PREGNANCY IN THIRD TRIMESTER: ICD-10-CM

## 2021-08-17 DIAGNOSIS — K21.9 GASTROESOPHAGEAL REFLUX DISEASE WITHOUT ESOPHAGITIS: ICD-10-CM

## 2021-08-17 DIAGNOSIS — O99.019 IRON DEFICIENCY ANEMIA DURING PREGNANCY: ICD-10-CM

## 2021-08-17 DIAGNOSIS — K40.90 UNILATERAL INGUINAL HERNIA WITHOUT OBSTRUCTION OR GANGRENE, RECURRENCE NOT SPECIFIED: ICD-10-CM

## 2021-08-17 PROCEDURE — 99214 OFFICE O/P EST MOD 30 MIN: CPT | Performed by: OBSTETRICS & GYNECOLOGY

## 2021-08-17 RX ORDER — METRONIDAZOLE 500 MG/1
500 TABLET ORAL 2 TIMES DAILY
Qty: 14 TABLET | Refills: 0 | Status: SHIPPED | OUTPATIENT
Start: 2021-08-17 | End: 2021-08-24

## 2021-08-18 NOTE — PROGRESS NOTES
Chief Complaint  Routine Prenatal Visit (Patient complains of possible leaking fluid x 1 week. )    History of Present Illness:  Britt is a  currently at 30w6d who presents today with complaints of possibly leaking fluid for the last week.  Patient reports intermittently noticing a thin vaginal discharge.  Patient has not been aware of any odor.  She denies any itching or irritation.  Patient denies any cramping or contractions.  She denies any fever or chills.  She denies any abdominal tenderness.  Patient does report positive fetal movement.  Patient does report she has been taking her prenatal vitamins and iron supplements.  Patient has also been taking her Pepcid.    Exam:  Vitals:  See prenatal flowsheet as noted and reviewed  General: Alert, cooperative, and does not appear in any distress  Abdomen:   See prenatal flowsheet as noted and reviewed    Uterus gravid, non-tender; no palpable masses other than hernia as noted.    No guarding or rebound tenderness  Pelvic:  See prenatal flowsheet as noted and reviewed    Speculum exam shows thin gray vaginal discharge with +odor.   Cultures are obtained.    There is no pooling of fluid with Valsalva maneuver.  Cervix is closed and thick.  Ext:  See prenatal flowsheet as noted and reviewed    Moves extremities well, no cyanosis and no redness  Urine:  See prenatal flowsheet as noted and reviewed    Data Review:  The following data was reviewed by: Lady Mcallister MD on 2021:  Prenatal Labs:  Lab Results   Component Value Date    HGB 10.7 (L) 2021    RUBELLAABIGG 1.63 2021    HEPBSAG Negative 2021    ABO A 2021    RH Positive 2021    ABSCRN Negative 2021    [id number] Non Reactive 2021    HEPCVIRUSABY <0.1 2021    URINECX CANCELED (A) 2021       Orders Only on 2021   Component Date Value   • Gestational Diabetes Scr* 2021 134    • WBC 2021 8.61    • RBC 2021 4.10    • Hemoglobin  2021 10.7*   • Hematocrit 2021 33.2*   • MCV 2021 81.0    • MCH 2021 26.1*   • MCHC 2021 32.2    • RDW 2021 13.5    • Platelets 2021 183    • Neutrophil Rel % 2021 75.0    • Lymphocyte Rel % 2021 16.1*   • Monocyte Rel % 2021 6.4    • Eosinophil Rel % 2021 1.3    • Basophil Rel % 2021 0.3    • Neutrophils Absolute 2021 6.45    • Lymphocytes Absolute 2021 1.39    • Monocytes Absolute 2021 0.55    • Eosinophils Absolute 2021 0.11    • Basophils Absolute 2021 0.03    • Immature Granulocyte Rel* 2021 0.9*   • Immature Grans Absolute 2021 0.08*   • nRBC 2021 0.0      Imaging:  US Ob 14 + Weeks Single or First Gestation  Britt Dia  : 2000  MRN: [id number]  Date: 2021    Reason for exam/History:  Anatomic Survey    Ultrasound images are reviewed.  There is noted to be a viable   intrauterine pregnancy.   The pregnancy is measuring 19 weeks 1 days   gestation.  The fetal heart rate was normal.  Normal anatomy was noted.   The placental location was noted to be anterior.  The amniotic fluid was   normal.  There is a two-vessel cord noted.    The exam limitations noted:  none    See the official report for actual measurements and structures seen.    Lady Mcallister MD, RDMS  [facility]  OB GYN [place]    Medical Records:  None    Assessment and Plan:  Problem List Items Addressed This Visit        Gastrointestinal Abdominal     Gastroesophageal reflux disease  Patient is to continue her Pepcid.      Other Visit Diagnoses     Encounter for supervision of high risk pregnancy in third trimester, antepartum    -  Primary  Topics discussed:     GERD management  iron supplementation  kick counts and fetal movement  PIH precautions   labor signs and symptoms  Scan next visit for growth as noted.    Relevant Orders    US Ob Follow Up Transabdominal Approach    Vaginal discharge  during pregnancy in third trimester      Patient with vaginal discharge as noted.  There is no evidence of leaking fluid.  Cultures are obtained.  Prescriptions also given for Flagyl.  Patient is to call for results.  Patient is to follow-up in 1 week.    Relevant Orders    NuSwab VG+ - Swab, Vagina    Unilateral inguinal hernia without obstruction or gangrene, recurrence not specified      Instructions and precautions have been given.    Two vessel umbilical cord in [place]on pregnancy, antepartum      Scan for growth next visit.    Relevant Orders    US Ob Follow Up Transabdominal Approach    Iron deficiency anemia during pregnancy      Patient is to continue her iron supplements.        Follow Up/Instructions:  Follow up as scheduled.  Patient was given instructions and counseling regarding her condition or for health maintenance advice. Please see specific information pulled into the AVS if appropriate.     Note: Speech recognition transcription software may have been used to dictate portions of this document.  An attempt at proofreading has been made though minor errors in transcription may still be present.    This note was electronically signed.  Lady Mcallister M.D.

## 2021-08-20 LAB
A VAGINAE DNA VAG QL NAA+PROBE: ABNORMAL SCORE
BVAB2 DNA VAG QL NAA+PROBE: ABNORMAL SCORE
C ALBICANS DNA VAG QL NAA+PROBE: NEGATIVE
C GLABRATA DNA VAG QL NAA+PROBE: NEGATIVE
C TRACH DNA VAG QL NAA+PROBE: NEGATIVE
MEGA1 DNA VAG QL NAA+PROBE: ABNORMAL SCORE
N GONORRHOEA DNA VAG QL NAA+PROBE: NEGATIVE
T VAGINALIS DNA VAG QL NAA+PROBE: ABNORMAL

## 2021-08-25 ENCOUNTER — HOSPITAL ENCOUNTER (OUTPATIENT)
Facility: HOSPITAL | Age: 21
Discharge: HOME | End: 2021-08-25
Attending: NURSE PRACTITIONER | Admitting: NURSE PRACTITIONER

## 2021-08-25 ENCOUNTER — HOSPITAL ENCOUNTER (OUTPATIENT)
Facility: HOSPITAL | Age: 21
End: 2021-08-25
Attending: NURSE PRACTITIONER | Admitting: NURSE PRACTITIONER

## 2021-08-25 VITALS
DIASTOLIC BLOOD PRESSURE: 67 MMHG | TEMPERATURE: 98.2 F | SYSTOLIC BLOOD PRESSURE: 106 MMHG | RESPIRATION RATE: 16 BRPM | HEART RATE: 102 BPM

## 2021-08-25 LAB
BILIRUB BLD-MCNC: NEGATIVE MG/DL
CLARITY, POC: ABNORMAL
COLOR UR: YELLOW
GLUCOSE UR STRIP-MCNC: NEGATIVE MG/DL
KETONES UR QL: NEGATIVE
LEUKOCYTE EST, POC: NEGATIVE
NITRITE UR-MCNC: NEGATIVE MG/ML
PH UR: 6 [PH] (ref 5–8)
PROT UR STRIP-MCNC: ABNORMAL MG/DL
RBC # UR STRIP: ABNORMAL /UL
SP GR UR: 1.03 (ref 1–1.03)
UROBILINOGEN UR QL: NORMAL

## 2021-08-25 PROCEDURE — G0463 HOSPITAL OUTPT CLINIC VISIT: HCPCS

## 2021-08-25 PROCEDURE — 81002 URINALYSIS NONAUTO W/O SCOPE: CPT | Performed by: NURSE PRACTITIONER

## 2021-08-25 RX ORDER — SODIUM CHLORIDE 0.9 % (FLUSH) 0.9 %
10 SYRINGE (ML) INJECTION EVERY 12 HOURS SCHEDULED
Status: DISCONTINUED | OUTPATIENT
Start: 2021-08-25 | End: 2021-08-25 | Stop reason: HOSPADM

## 2021-08-25 RX ORDER — SODIUM CHLORIDE, SODIUM LACTATE, POTASSIUM CHLORIDE, CALCIUM CHLORIDE 600; 310; 30; 20 MG/100ML; MG/100ML; MG/100ML; MG/100ML
150 INJECTION, SOLUTION INTRAVENOUS CONTINUOUS
Status: DISCONTINUED | OUTPATIENT
Start: 2021-08-25 | End: 2021-08-25 | Stop reason: HOSPADM

## 2021-08-25 RX ORDER — SODIUM CHLORIDE 0.9 % (FLUSH) 0.9 %
10 SYRINGE (ML) INJECTION AS NEEDED
Status: DISCONTINUED | OUTPATIENT
Start: 2021-08-25 | End: 2021-08-25 | Stop reason: HOSPADM

## 2021-08-25 NOTE — NURSING NOTE
0236) Status update given to Dr. Echols. Unable to reach Won GREEN at this time. Order received may DC home when pt is feeling better and ctx's are spaced out.

## 2021-08-25 NOTE — NURSING NOTE
"Triage Note - Nursing Documentation  Labor and Delivery Admission Log    Britt Dia  : 2000  MRN: [id number]  CSN: [id number]    Date in / Time in:  2021  Time in: 12    Date out / Time out:    Time out: 355  Nurse: Rebecca Dia RN    Patient Info: She is a 21 y.o. year old  at 31w6d with an AWILDA of 10/21/2021, by Ultrasound who was seen on the [facility].    Chief Complaint:   Chief Complaint   Patient presents with   • Abdominal Cramping     \"I think I'm having contractions\"       Provider Instructions / Disposition: Pt with irregular mild ctx's. Cervix closed/thick/posterior. FHR reactive 130's mod variability, accels, +FM. No leaking or bleeding. Reports is taking Flagyl for an \"infection\". + for BV in office last week. PO hydrated. IVF bolus 1 liter given. Ctx's eventually spaced out and stopped. Pt reports discomfort gone. Dc'd home. Has appt tomorrow at office.     Patient Active Problem List   Diagnosis   • Gastroesophageal reflux disease   • Depressive disorder   • Subchorionic hemorrhage in first trimester       NST Documentation (Only applicable > 32 weeks):      "

## 2021-08-26 ENCOUNTER — ROUTINE PRENATAL (OUTPATIENT)
Dept: OBSTETRICS AND GYNECOLOGY | Facility: CLINIC | Age: 21
End: 2021-08-26

## 2021-08-26 VITALS — WEIGHT: 144 LBS | SYSTOLIC BLOOD PRESSURE: 108 MMHG | BODY MASS INDEX: 23.96 KG/M2 | DIASTOLIC BLOOD PRESSURE: 64 MMHG

## 2021-08-26 DIAGNOSIS — O40.3XX0 POLYHYDRAMNIOS IN THIRD TRIMESTER COMPLICATION, SINGLE OR UNSPECIFIED FETUS: Primary | ICD-10-CM

## 2021-08-26 PROCEDURE — 99213 OFFICE O/P EST LOW 20 MIN: CPT | Performed by: OBSTETRICS & GYNECOLOGY

## 2021-08-26 NOTE — PROGRESS NOTES
Chief Complaint   Patient presents with   • Routine Prenatal Visit        HPI:   , 32w0d gestation reports doing well    ROS:  See Prenatal Episode/Flowsheet  /64   Wt 65.3 kg (144 lb)   LMP 01/15/2021   BMI 23.96 kg/m²      EXAM:  EXTREMITIES:  No swelling-See Prenatal Episode/Flowsheet    ABDOMEN:  FHTs/Movement noted-See Prenatal Episode/Flowsheet    URINE GLUCOSE/PROTEIN:  See Prenatal Episode/Flowsheet    PELVIC EXAM:  See Prenatal Episode/Flowsheet  CV:  Lungs:  GYN:    MDM:    Lab Results   Component Value Date    HGB 10.7 (L) 2021    RUBELLAABIGG 1.63 2021    HEPBSAG Negative 2021    ABO A 2021    RH Positive 2021    ABSCRN Negative 2021    [id number] Non Reactive 2021    HEPCVIRUSABY <0.1 2021    URINECX CANCELED (A) 2021       U/S: Overall growth 62 percentile.  HC 12.1%.  A C 84%.  KELLI 26.  Vertex.  Anterior placenta.  2-vessel cord again noted.    1. IUP 32w0d  2. Routine care   3.  Increased fluid-repeat KELLI 2 weeks, normal glucola-- but get fasting glucose @ next visit  4.  Two-vessel cord.  5.  Proven to 5 pounds 7 ounces  6. Anemia: taking fE and PNV

## 2021-09-16 ENCOUNTER — ROUTINE PRENATAL (OUTPATIENT)
Dept: OBSTETRICS AND GYNECOLOGY | Facility: CLINIC | Age: 21
End: 2021-09-16

## 2021-09-16 VITALS — WEIGHT: 144 LBS | SYSTOLIC BLOOD PRESSURE: 114 MMHG | DIASTOLIC BLOOD PRESSURE: 68 MMHG | BODY MASS INDEX: 23.96 KG/M2

## 2021-09-16 DIAGNOSIS — O40.3XX0 POLYHYDRAMNIOS IN THIRD TRIMESTER COMPLICATION, SINGLE OR UNSPECIFIED FETUS: Primary | ICD-10-CM

## 2021-09-16 PROCEDURE — 99213 OFFICE O/P EST LOW 20 MIN: CPT | Performed by: OBSTETRICS & GYNECOLOGY

## 2021-09-16 NOTE — PROGRESS NOTES
Chief Complaint   Patient presents with   • Routine Prenatal Visit     KELLI and FBS        HPI:   , 35w0d gestation reports doing well    ROS:  See Prenatal Episode/Flowsheet  /68   Wt 65.3 kg (144 lb)   LMP 01/15/2021   BMI 23.96 kg/m²      EXAM:  EXTREMITIES:  No swelling-See Prenatal Episode/Flowsheet    ABDOMEN:  FHTs/Movement noted-See Prenatal Episode/Flowsheet    URINE GLUCOSE/PROTEIN:  See Prenatal Episode/Flowsheet    PELVIC EXAM:  See Prenatal Episode/Flowsheet  CV:  Lungs:  GYN:    MDM:    Lab Results   Component Value Date    HGB 10.7 (L) 2021    RUBELLAABIGG 1.63 2021    HEPBSAG Negative 2021    ABO A 2021    RH Positive 2021    ABSCRN Negative 2021    [id number] Non Reactive 2021    HEPCVIRUSABY <0.1 2021    URINECX CANCELED (A) 2021       U/S:KELLI 22, Active fetus    1. IUP 35w0d  2. Routine care   3.  Borderline KELLI.  22.6 today.  It was 26 last ultrasound.  Her fasting blood sugar is 84.  This was done secondary to fetal size did on last ultrasound.  Repeat for growth next time  4. 2VC

## 2021-09-23 ENCOUNTER — ROUTINE PRENATAL (OUTPATIENT)
Dept: OBSTETRICS AND GYNECOLOGY | Facility: CLINIC | Age: 21
End: 2021-09-23

## 2021-09-23 VITALS — DIASTOLIC BLOOD PRESSURE: 72 MMHG | SYSTOLIC BLOOD PRESSURE: 110 MMHG | WEIGHT: 146 LBS | BODY MASS INDEX: 24.3 KG/M2

## 2021-09-23 DIAGNOSIS — O40.3XX0 POLYHYDRAMNIOS IN THIRD TRIMESTER COMPLICATION, SINGLE OR UNSPECIFIED FETUS: ICD-10-CM

## 2021-09-23 DIAGNOSIS — Z34.93 PRENATAL CARE IN THIRD TRIMESTER: Primary | ICD-10-CM

## 2021-09-23 DIAGNOSIS — Z36.85 ANTENATAL SCREENING FOR STREPTOCOCCUS B: ICD-10-CM

## 2021-09-23 DIAGNOSIS — Q27.0 SINGLE UMBILICAL ARTERY: ICD-10-CM

## 2021-09-23 PROCEDURE — 99213 OFFICE O/P EST LOW 20 MIN: CPT | Performed by: OBSTETRICS & GYNECOLOGY

## 2021-09-24 PROBLEM — O40.3XX0 POLYHYDRAMNIOS IN THIRD TRIMESTER: Status: ACTIVE | Noted: 2021-09-24

## 2021-09-24 PROBLEM — Q27.0 SINGLE UMBILICAL ARTERY: Status: ACTIVE | Noted: 2021-09-24

## 2021-09-24 NOTE — PROGRESS NOTES
Prenatal Care Visit    Subjective   Chief Complaint   Patient presents with   • Routine Prenatal Visit     Growth scan, BPP and GBS done today. Contractions       History:   Britt is a  currently at 36w1d who presents for a prenatal care visit today.    Contractions.    Social History    Tobacco Use      Smoking status: Never Smoker      Smokeless tobacco: Never Used       Objective   /72   Wt 66.2 kg (146 lb)   LMP 01/15/2021   BMI 24.30 kg/m²   Physical Exam:  Normal, gestational age-appropriate exam today   SVE 2.5/50/-3       Plan   Medical Decision Making:    I have reviewed the prenatal labs and ultrasound(s) today. I have reviewed the most recent prenatal progress note(s).    Diagnosis: Supervision of high risk pregnancy   Polyhydramnios  2VC   Tests/Orders/Rx today: Orders Placed This Encounter   Procedures   • Strep Grp B MATTHEW + Reflex - Swab, Vaginal/Rectum     Order Specific Question:   Release to patient     Answer:   Immediate   • US Ob Follow Up Transabdominal Approach     Order Specific Question:   Reason for Exam:     Answer:   2 vessel cord   • US Fetal Biophysical Profile;Without Non-Stress Testing     Order Specific Question:   Reason for Exam:     Answer:   2 vessel cord       Medication Management: None     Topics discussed: Prenatal care milestones  kick counts and fetal movement  labor signs and symptoms  PIH precautions  U/S findings    Tests next visit: none   Next visit: 1 week(s)     Alfonzo Resendiz MD  Obstetrics and Gynecology  [facility]

## 2021-09-25 ENCOUNTER — HOSPITAL ENCOUNTER (OUTPATIENT)
Facility: HOSPITAL | Age: 21
Discharge: HOME | End: 2021-09-25
Attending: MIDWIFE | Admitting: MIDWIFE

## 2021-09-25 VITALS
HEART RATE: 73 BPM | HEIGHT: 61 IN | TEMPERATURE: 98.5 F | DIASTOLIC BLOOD PRESSURE: 79 MMHG | SYSTOLIC BLOOD PRESSURE: 108 MMHG | BODY MASS INDEX: 27.19 KG/M2 | WEIGHT: 144 LBS | RESPIRATION RATE: 16 BRPM

## 2021-09-25 LAB
A1 MICROGLOB PLACENTAL VAG QL: NEGATIVE
BILIRUB BLD-MCNC: NEGATIVE MG/DL
CLARITY, POC: ABNORMAL
COLOR UR: YELLOW
GLUCOSE UR STRIP-MCNC: NEGATIVE MG/DL
GP B STREP DNA SPEC QL NAA+PROBE: NEGATIVE
KETONES UR QL: NEGATIVE
LEUKOCYTE EST, POC: ABNORMAL
NITRITE UR-MCNC: NEGATIVE MG/ML
PH UR: 7 [PH] (ref 5–8)
PROT UR STRIP-MCNC: NEGATIVE MG/DL
RBC # UR STRIP: NEGATIVE /UL
SP GR UR: 1.01 (ref 1–1.03)
UROBILINOGEN UR QL: NORMAL

## 2021-09-25 PROCEDURE — 84112 EVAL AMNIOTIC FLUID PROTEIN: CPT | Performed by: MIDWIFE

## 2021-09-25 PROCEDURE — 81002 URINALYSIS NONAUTO W/O SCOPE: CPT | Performed by: MIDWIFE

## 2021-09-25 PROCEDURE — 59025 FETAL NON-STRESS TEST: CPT

## 2021-09-25 PROCEDURE — 59025 FETAL NON-STRESS TEST: CPT | Performed by: MIDWIFE

## 2021-09-25 PROCEDURE — G0463 HOSPITAL OUTPT CLINIC VISIT: HCPCS

## 2021-09-25 NOTE — NON STRESS TEST
Triage Note - Nursing Documentation  Labor and Delivery Admission Log    Britt Dia  : 2000  MRN: [id number]  CSN: [id number]    Date in / Time in:  2021  Time in:     Date out / Time out:    Time out:     Nurse: Rosa Wang RN    Patient Info: She is a 21 y.o. year old  at 36w2d with an AWILDA of 10/21/2021, by Ultrasound who was seen on the [facility] [place].    Chief Complaint:   Chief Complaint   Patient presents with   • Contractions     STARTED ABOUT 3 HOURS AGO       Provider Instructions / Disposition: Pt presented to [facility] [place] with contractions and leaking fluid. Amnisure was completed and negative. Not leaking fluid was noted. Pt was PO hydrated and VSS. PT was given instructions on  labor, fetal kick counts, and hypertension. Pt was told to return to [facility] [place] with any complication     Patient Active Problem List   Diagnosis   • Gastroesophageal reflux disease   • Depressive disorder   • Subchorionic hemorrhage in first trimester   • Single umbilical artery   • Polyhydramnios in third trimester       NST Documentation (Only applicable > 32 weeks): Interpretation A  Nonstress Test Interpretation A: Reactive (21 : Rosa aWng, JACKI)

## 2021-09-28 ENCOUNTER — HOSPITAL ENCOUNTER (OUTPATIENT)
Facility: HOSPITAL | Age: 21
Discharge: HOME | End: 2021-09-28
Attending: NURSE PRACTITIONER | Admitting: NURSE PRACTITIONER

## 2021-09-28 VITALS
SYSTOLIC BLOOD PRESSURE: 94 MMHG | WEIGHT: 147 LBS | TEMPERATURE: 99 F | RESPIRATION RATE: 16 BRPM | OXYGEN SATURATION: 100 % | BODY MASS INDEX: 27.75 KG/M2 | HEART RATE: 78 BPM | HEIGHT: 61 IN | DIASTOLIC BLOOD PRESSURE: 63 MMHG

## 2021-09-28 LAB
BACTERIA UR QL AUTO: ABNORMAL /HPF
BILIRUB UR QL STRIP: NEGATIVE
CLARITY UR: ABNORMAL
COLOR UR: YELLOW
GLUCOSE UR STRIP-MCNC: NEGATIVE MG/DL
HGB UR QL STRIP.AUTO: NEGATIVE
HYALINE CASTS UR QL AUTO: ABNORMAL /LPF
KETONES UR QL STRIP: NEGATIVE
LEUKOCYTE ESTERASE UR QL STRIP.AUTO: ABNORMAL
NITRITE UR QL STRIP: NEGATIVE
PH UR STRIP.AUTO: 8 [PH] (ref 5–8)
PROT UR QL STRIP: NEGATIVE
RBC # UR: ABNORMAL /HPF
REF LAB TEST METHOD: ABNORMAL
SP GR UR STRIP: 1.01 (ref 1–1.03)
SQUAMOUS #/AREA URNS HPF: ABNORMAL /HPF
UROBILINOGEN UR QL STRIP: ABNORMAL
WBC UR QL AUTO: ABNORMAL /HPF

## 2021-09-28 PROCEDURE — 81001 URINALYSIS AUTO W/SCOPE: CPT | Performed by: NURSE PRACTITIONER

## 2021-09-28 PROCEDURE — G0463 HOSPITAL OUTPT CLINIC VISIT: HCPCS

## 2021-09-28 PROCEDURE — 87086 URINE CULTURE/COLONY COUNT: CPT | Performed by: NURSE PRACTITIONER

## 2021-09-28 PROCEDURE — 59025 FETAL NON-STRESS TEST: CPT

## 2021-09-28 PROCEDURE — 59025 FETAL NON-STRESS TEST: CPT | Performed by: NURSE PRACTITIONER

## 2021-09-29 LAB — BACTERIA SPEC AEROBE CULT: NORMAL

## 2021-10-01 ENCOUNTER — ROUTINE PRENATAL (OUTPATIENT)
Dept: OBSTETRICS AND GYNECOLOGY | Facility: CLINIC | Age: 21
End: 2021-10-01

## 2021-10-01 VITALS — SYSTOLIC BLOOD PRESSURE: 100 MMHG | DIASTOLIC BLOOD PRESSURE: 60 MMHG | WEIGHT: 147 LBS | BODY MASS INDEX: 27.78 KG/M2

## 2021-10-01 DIAGNOSIS — O40.3XX0 POLYHYDRAMNIOS IN THIRD TRIMESTER COMPLICATION, SINGLE OR UNSPECIFIED FETUS: ICD-10-CM

## 2021-10-01 DIAGNOSIS — Q27.0 SINGLE UMBILICAL ARTERY: Primary | ICD-10-CM

## 2021-10-01 PROCEDURE — 99213 OFFICE O/P EST LOW 20 MIN: CPT | Performed by: NURSE PRACTITIONER

## 2021-10-01 NOTE — PROGRESS NOTES
[id number]  Chief Complaint   Patient presents with   • Routine Prenatal Visit     BPP done today, no complaints.        IRENA De La Torre is a  currently at 37w1d who today reports the following:   Good FM   No c/o  Taking iron and PNV daily        EXAM  /60   Wt 66.7 kg (147 lb)   LMP 01/15/2021   BMI 27.78 kg/m²  -See Prenatal Assessment  General Appearance:  Pleasant  Lungs: Breathing unlabored  Abdomen:  See flow sheet for Fundal ht, FM, FHT's  LE: Neg edema  V/E: 2/50%/-3    Social History     Tobacco Use   • Smoking status: Never Smoker   • Smokeless tobacco: Never Used   Vaping Use   • Vaping Use: Never used   Substance Use Topics   • Alcohol use: Never   • Drug use: Never         Lab Results   Component Value Date    ABO A 2021    RH Positive 2021    ABSCRN Negative 2021       MDM  Impression: Supervision of high risk pregnancy   Closely space pregnancies  Polyhydramnios  2 VC  Anemia    Tests done today: U/S BPP   KELLI 26.82    Topics discussed: S/S labor and adeq FM/Kick Counts  Continue iron BID  BPP on Monday or Tues / pts option  Will call if plans differ after talking MD on call   encouraged questions - call prn    Tests next visit: none

## 2021-10-05 ENCOUNTER — HOSPITAL ENCOUNTER (OUTPATIENT)
Facility: HOSPITAL | Age: 21
Discharge: HOME | End: 2021-10-05
Attending: NURSE PRACTITIONER | Admitting: NURSE PRACTITIONER

## 2021-10-05 ENCOUNTER — HOSPITAL ENCOUNTER (OUTPATIENT)
Dept: LABOR AND DELIVERY | Facility: HOSPITAL | Age: 21
Discharge: HOME | End: 2021-10-05

## 2021-10-05 VITALS
WEIGHT: 147 LBS | HEART RATE: 91 BPM | HEIGHT: 61 IN | SYSTOLIC BLOOD PRESSURE: 100 MMHG | BODY MASS INDEX: 27.75 KG/M2 | OXYGEN SATURATION: 100 % | TEMPERATURE: 98.3 F | DIASTOLIC BLOOD PRESSURE: 71 MMHG | RESPIRATION RATE: 16 BRPM

## 2021-10-05 LAB
BACTERIA UR QL AUTO: ABNORMAL /HPF
BILIRUB UR QL STRIP: NEGATIVE
CLARITY UR: ABNORMAL
COLOR UR: YELLOW
GLUCOSE UR STRIP-MCNC: NEGATIVE MG/DL
HGB UR QL STRIP.AUTO: NEGATIVE
HYALINE CASTS UR QL AUTO: ABNORMAL /LPF
KETONES UR QL STRIP: NEGATIVE
LEUKOCYTE ESTERASE UR QL STRIP.AUTO: ABNORMAL
MUCOUS THREADS URNS QL MICRO: ABNORMAL /HPF
NITRITE UR QL STRIP: NEGATIVE
PH UR STRIP.AUTO: 7 [PH] (ref 5–8)
PROT UR QL STRIP: NEGATIVE
RBC # UR: ABNORMAL /HPF
REF LAB TEST METHOD: ABNORMAL
SP GR UR STRIP: 1.02 (ref 1–1.03)
SQUAMOUS #/AREA URNS HPF: ABNORMAL /HPF
UROBILINOGEN UR QL STRIP: ABNORMAL
WBC UR QL AUTO: ABNORMAL /HPF

## 2021-10-05 PROCEDURE — 59025 FETAL NON-STRESS TEST: CPT

## 2021-10-05 PROCEDURE — 81001 URINALYSIS AUTO W/SCOPE: CPT | Performed by: NURSE PRACTITIONER

## 2021-10-05 PROCEDURE — 87086 URINE CULTURE/COLONY COUNT: CPT | Performed by: NURSE PRACTITIONER

## 2021-10-05 PROCEDURE — 59025 FETAL NON-STRESS TEST: CPT | Performed by: NURSE PRACTITIONER

## 2021-10-05 PROCEDURE — G0463 HOSPITAL OUTPT CLINIC VISIT: HCPCS

## 2021-10-05 NOTE — NON STRESS TEST
Pt reported to RN she thought she had s/s of BV - reports noted odor mostly with urination   Denies LOF or VB  V/E  1-2/50%/high / no abnormal d/c - no odor - No bloody d/c   Plan:  U/A with C&S   If + s/s BV - will do culture in office on Friday.  Reviewed S/S labor and adequate FM   Encouraged questions and answered     NST reactive       Triage Note - Nursing Documentation  Labor and Delivery Admission Log    Britt Dia  : 2000  MRN: [id number]  CSN: [id number]    Date in / Time in:  10/5/2021  Time in: 849    Date out / Time out:     Time out: 1005  Nurse: Bhavya Wang RN    Patient Info: She is a 21 y.o. year old  at 37w5d with an AWILDA of 10/21/2021, by Ultrasound who was seen on the [facility].    Chief Complaint:   Chief Complaint   Patient presents with    Non-stress Test     POLYHYDRAMINOIS       Provider Instructions / Disposition: To L/D for sched NST. Reactive NST, Vaginal exam with no change from previous , 1-2 cm . Pt with complaints of vaginal odor, per PITA Gonzalez Aprn will do culture on Friday office visit. THI given with VU of all    Patient Active Problem List   Diagnosis    Gastroesophageal reflux disease    Depressive disorder    Subchorionic hemorrhage in first trimester    Single umbilical artery    Polyhydramnios in third trimester       NST Documentation (Only applicable > 32 weeks): Interpretation A  Nonstress Test Interpretation A: Reactive (10/05/21 1023 : Bhavya Wang, RN)

## 2021-10-06 LAB — BACTERIA SPEC AEROBE CULT: NORMAL

## 2021-10-08 ENCOUNTER — PREP FOR SURGERY (OUTPATIENT)
Dept: OTHER | Facility: HOSPITAL | Age: 21
End: 2021-10-08

## 2021-10-08 ENCOUNTER — ROUTINE PRENATAL (OUTPATIENT)
Dept: OBSTETRICS AND GYNECOLOGY | Facility: CLINIC | Age: 21
End: 2021-10-08

## 2021-10-08 VITALS — BODY MASS INDEX: 27.59 KG/M2 | DIASTOLIC BLOOD PRESSURE: 58 MMHG | WEIGHT: 146 LBS | SYSTOLIC BLOOD PRESSURE: 114 MMHG

## 2021-10-08 DIAGNOSIS — O09.93 ENCOUNTER FOR SUPERVISION OF HIGH RISK PREGNANCY IN THIRD TRIMESTER, ANTEPARTUM: Primary | ICD-10-CM

## 2021-10-08 DIAGNOSIS — O99.019 IRON DEFICIENCY ANEMIA DURING PREGNANCY: ICD-10-CM

## 2021-10-08 DIAGNOSIS — D50.9 IRON DEFICIENCY ANEMIA DURING PREGNANCY: ICD-10-CM

## 2021-10-08 DIAGNOSIS — Z3A.38 38 WEEKS GESTATION OF PREGNANCY: Primary | ICD-10-CM

## 2021-10-08 DIAGNOSIS — Q27.0 SINGLE UMBILICAL ARTERY: ICD-10-CM

## 2021-10-08 DIAGNOSIS — K40.90 UNILATERAL INGUINAL HERNIA WITHOUT OBSTRUCTION OR GANGRENE, RECURRENCE NOT SPECIFIED: ICD-10-CM

## 2021-10-08 DIAGNOSIS — O40.3XX0 POLYHYDRAMNIOS IN THIRD TRIMESTER COMPLICATION, SINGLE OR UNSPECIFIED FETUS: ICD-10-CM

## 2021-10-08 PROCEDURE — 99214 OFFICE O/P EST MOD 30 MIN: CPT | Performed by: OBSTETRICS & GYNECOLOGY

## 2021-10-08 RX ORDER — HYDROCODONE BITARTRATE AND ACETAMINOPHEN 5; 325 MG/1; MG/1
2 TABLET ORAL ONCE AS NEEDED
Status: CANCELLED | OUTPATIENT
Start: 2021-10-08

## 2021-10-08 RX ORDER — MORPHINE SULFATE 1 MG/ML
6 INJECTION, SOLUTION EPIDURAL; INTRATHECAL; INTRAVENOUS EVERY 4 HOURS PRN
Status: CANCELLED | OUTPATIENT
Start: 2021-10-08 | End: 2021-10-18

## 2021-10-08 RX ORDER — PROMETHAZINE HYDROCHLORIDE 12.5 MG/1
12.5 TABLET ORAL EVERY 6 HOURS PRN
Status: CANCELLED | OUTPATIENT
Start: 2021-10-08

## 2021-10-08 RX ORDER — OXYTOCIN/0.9 % SODIUM CHLORIDE 30/500 ML
650 PLASTIC BAG, INJECTION (ML) INTRAVENOUS ONCE
Status: CANCELLED | OUTPATIENT
Start: 2021-10-08 | End: 2021-10-08

## 2021-10-08 RX ORDER — SODIUM CHLORIDE 0.9 % (FLUSH) 0.9 %
10 SYRINGE (ML) INJECTION EVERY 12 HOURS SCHEDULED
Status: CANCELLED | OUTPATIENT
Start: 2021-10-08

## 2021-10-08 RX ORDER — MORPHINE SULFATE 4 MG/ML
4 INJECTION, SOLUTION INTRAMUSCULAR; INTRAVENOUS ONCE AS NEEDED
Status: CANCELLED | OUTPATIENT
Start: 2021-10-08

## 2021-10-08 RX ORDER — MORPHINE SULFATE 2 MG/ML
2 INJECTION, SOLUTION INTRAMUSCULAR; INTRAVENOUS ONCE AS NEEDED
Status: CANCELLED | OUTPATIENT
Start: 2021-10-08

## 2021-10-08 RX ORDER — LIDOCAINE HYDROCHLORIDE 10 MG/ML
5 INJECTION, SOLUTION EPIDURAL; INFILTRATION; INTRACAUDAL; PERINEURAL AS NEEDED
Status: CANCELLED | OUTPATIENT
Start: 2021-10-08

## 2021-10-08 RX ORDER — OXYTOCIN/0.9 % SODIUM CHLORIDE 30/500 ML
85 PLASTIC BAG, INJECTION (ML) INTRAVENOUS ONCE
Status: CANCELLED | OUTPATIENT
Start: 2021-10-08 | End: 2021-10-08

## 2021-10-08 RX ORDER — ACETAMINOPHEN 325 MG/1
650 TABLET ORAL ONCE AS NEEDED
Status: CANCELLED | OUTPATIENT
Start: 2021-10-08

## 2021-10-08 RX ORDER — MORPHINE SULFATE 4 MG/ML
4 INJECTION, SOLUTION INTRAMUSCULAR; INTRAVENOUS EVERY 4 HOURS PRN
Status: CANCELLED | OUTPATIENT
Start: 2021-10-08 | End: 2021-10-18

## 2021-10-08 RX ORDER — METHYLERGONOVINE MALEATE 0.2 MG/ML
200 INJECTION INTRAVENOUS ONCE AS NEEDED
Status: CANCELLED | OUTPATIENT
Start: 2021-10-08 | End: 2021-10-09

## 2021-10-08 RX ORDER — MISOPROSTOL 200 UG/1
800 TABLET ORAL ONCE AS NEEDED
Status: CANCELLED | OUTPATIENT
Start: 2021-10-08 | End: 2021-10-09

## 2021-10-08 RX ORDER — PROMETHAZINE HYDROCHLORIDE 12.5 MG/1
12.5 SUPPOSITORY RECTAL EVERY 6 HOURS PRN
Status: CANCELLED | OUTPATIENT
Start: 2021-10-08

## 2021-10-08 RX ORDER — OXYTOCIN/0.9 % SODIUM CHLORIDE 30/500 ML
1-20 PLASTIC BAG, INJECTION (ML) INTRAVENOUS
Status: CANCELLED | OUTPATIENT
Start: 2021-10-08

## 2021-10-08 RX ORDER — ONDANSETRON 4 MG/1
4 TABLET, FILM COATED ORAL ONCE AS NEEDED
Status: CANCELLED | OUTPATIENT
Start: 2021-10-08

## 2021-10-08 RX ORDER — SODIUM CHLORIDE, SODIUM LACTATE, POTASSIUM CHLORIDE, CALCIUM CHLORIDE 600; 310; 30; 20 MG/100ML; MG/100ML; MG/100ML; MG/100ML
125 INJECTION, SOLUTION INTRAVENOUS CONTINUOUS
Status: CANCELLED | OUTPATIENT
Start: 2021-10-08

## 2021-10-08 RX ORDER — CARBOPROST TROMETHAMINE 250 UG/ML
250 INJECTION, SOLUTION INTRAMUSCULAR ONCE AS NEEDED
Status: CANCELLED | OUTPATIENT
Start: 2021-10-08 | End: 2021-10-09

## 2021-10-08 RX ORDER — ONDANSETRON 2 MG/ML
4 INJECTION INTRAMUSCULAR; INTRAVENOUS ONCE AS NEEDED
Status: CANCELLED | OUTPATIENT
Start: 2021-10-08

## 2021-10-08 RX ORDER — SODIUM CHLORIDE 0.9 % (FLUSH) 0.9 %
1-10 SYRINGE (ML) INJECTION AS NEEDED
Status: CANCELLED | OUTPATIENT
Start: 2021-10-08

## 2021-10-09 ENCOUNTER — HOSPITAL ENCOUNTER (OUTPATIENT)
Facility: HOSPITAL | Age: 21
Discharge: HOME | End: 2021-10-10
Attending: OBSTETRICS & GYNECOLOGY | Admitting: OBSTETRICS & GYNECOLOGY

## 2021-10-09 LAB
A1 MICROGLOB PLACENTAL VAG QL: NEGATIVE
BACTERIA UR QL AUTO: ABNORMAL /HPF
BILIRUB UR QL STRIP: NEGATIVE
CLARITY UR: ABNORMAL
COLOR UR: YELLOW
GLUCOSE UR STRIP-MCNC: NEGATIVE MG/DL
HGB UR QL STRIP.AUTO: NEGATIVE
HYALINE CASTS UR QL AUTO: ABNORMAL /LPF
KETONES UR QL STRIP: NEGATIVE
LEUKOCYTE ESTERASE UR QL STRIP.AUTO: ABNORMAL
MUCOUS THREADS URNS QL MICRO: ABNORMAL /HPF
NITRITE UR QL STRIP: NEGATIVE
PH UR STRIP.AUTO: 7 [PH] (ref 5–8)
PROT UR QL STRIP: NEGATIVE
RBC # UR: ABNORMAL /HPF
REF LAB TEST METHOD: ABNORMAL
SP GR UR STRIP: 1.02 (ref 1–1.03)
SQUAMOUS #/AREA URNS HPF: ABNORMAL /HPF
UROBILINOGEN UR QL STRIP: ABNORMAL
WBC UR QL AUTO: ABNORMAL /HPF

## 2021-10-09 PROCEDURE — 81001 URINALYSIS AUTO W/SCOPE: CPT | Performed by: OBSTETRICS & GYNECOLOGY

## 2021-10-09 PROCEDURE — 84112 EVAL AMNIOTIC FLUID PROTEIN: CPT | Performed by: OBSTETRICS & GYNECOLOGY

## 2021-10-09 RX ORDER — SODIUM CHLORIDE, SODIUM LACTATE, POTASSIUM CHLORIDE, CALCIUM CHLORIDE 600; 310; 30; 20 MG/100ML; MG/100ML; MG/100ML; MG/100ML
125 INJECTION, SOLUTION INTRAVENOUS CONTINUOUS
Status: DISCONTINUED | OUTPATIENT
Start: 2021-10-10 | End: 2021-10-10 | Stop reason: HOSPADM

## 2021-10-09 RX ORDER — SODIUM CHLORIDE 0.9 % (FLUSH) 0.9 %
10 SYRINGE (ML) INJECTION AS NEEDED
Status: DISCONTINUED | OUTPATIENT
Start: 2021-10-09 | End: 2021-10-10 | Stop reason: HOSPADM

## 2021-10-09 RX ORDER — SODIUM CHLORIDE 0.9 % (FLUSH) 0.9 %
3 SYRINGE (ML) INJECTION EVERY 12 HOURS SCHEDULED
Status: DISCONTINUED | OUTPATIENT
Start: 2021-10-10 | End: 2021-10-10 | Stop reason: HOSPADM

## 2021-10-10 VITALS
SYSTOLIC BLOOD PRESSURE: 104 MMHG | OXYGEN SATURATION: 100 % | HEART RATE: 89 BPM | HEIGHT: 61 IN | RESPIRATION RATE: 17 BRPM | WEIGHT: 149.3 LBS | DIASTOLIC BLOOD PRESSURE: 71 MMHG | TEMPERATURE: 98.3 F | BODY MASS INDEX: 28.19 KG/M2

## 2021-10-10 PROCEDURE — 59025 FETAL NON-STRESS TEST: CPT

## 2021-10-10 PROCEDURE — G0463 HOSPITAL OUTPT CLINIC VISIT: HCPCS

## 2021-10-10 PROCEDURE — 59025 FETAL NON-STRESS TEST: CPT | Performed by: OBSTETRICS & GYNECOLOGY

## 2021-10-10 NOTE — NON STRESS TEST
Triage Note - Nursing Documentation  Labor and Delivery Admission Log    Britt Dia  : 2000  MRN: [id number]  CSN: [id number]    Date in / Time in:  10-  Time in:     Date out / Time out:  10-  Time out: 0018    Nurse: Viviane Diaz RN    Patient Info: She is a 21 y.o. year old  at 38w3d with an AWILDA of 10/21/2021, by Ultrasound who was seen on the [facility] Labor [place].    Chief Complaint:   Chief Complaint   Patient presents with   • Leaking Fluid     pt c/o leaking fluid starting around 0300 last night       Provider Instructions / Disposition: PO hydration. -amnisure. VE and UA with no change from last visit. Keep appt for NST as scheduled.    Patient Active Problem List   Diagnosis   • Gastroesophageal reflux disease   • Depressive disorder   • Subchorionic hemorrhage in first trimester   • Single umbilical artery   • Polyhydramnios in third trimester       NST Documentation (Only applicable > 32 weeks): Interpretation A  Nonstress Test Interpretation A: Reactive (10/09/21 2330 : Viviane Diaz, RN)

## 2021-10-11 ENCOUNTER — HOSPITAL ENCOUNTER (OUTPATIENT)
Facility: HOSPITAL | Age: 21
Discharge: HOME | End: 2021-10-11
Attending: MIDWIFE | Admitting: MIDWIFE

## 2021-10-11 ENCOUNTER — HOSPITAL ENCOUNTER (OUTPATIENT)
Dept: LABOR AND DELIVERY | Facility: HOSPITAL | Age: 21
Discharge: HOME | End: 2021-10-11

## 2021-10-11 VITALS
RESPIRATION RATE: 16 BRPM | DIASTOLIC BLOOD PRESSURE: 71 MMHG | OXYGEN SATURATION: 96 % | HEART RATE: 116 BPM | SYSTOLIC BLOOD PRESSURE: 105 MMHG | TEMPERATURE: 98.1 F

## 2021-10-11 LAB
BACTERIA UR QL AUTO: ABNORMAL /HPF
BILIRUB BLD-MCNC: NEGATIVE MG/DL
BILIRUB UR QL STRIP: NEGATIVE
CLARITY UR: ABNORMAL
CLARITY, POC: ABNORMAL
COLOR UR: YELLOW
COLOR UR: YELLOW
GLUCOSE UR STRIP-MCNC: NEGATIVE MG/DL
GLUCOSE UR STRIP-MCNC: NEGATIVE MG/DL
HGB UR QL STRIP.AUTO: NEGATIVE
KETONES UR QL STRIP: NEGATIVE
KETONES UR QL: NEGATIVE
LEUKOCYTE EST, POC: ABNORMAL
LEUKOCYTE ESTERASE UR QL STRIP.AUTO: ABNORMAL
NITRITE UR QL STRIP: NEGATIVE
NITRITE UR-MCNC: NEGATIVE MG/ML
PH UR STRIP.AUTO: 7 [PH] (ref 5–8)
PH UR: 6.5 [PH] (ref 5–8)
PROT UR QL STRIP: NEGATIVE
PROT UR STRIP-MCNC: NEGATIVE MG/DL
RBC # UR STRIP: NEGATIVE /UL
RBC # UR: ABNORMAL /HPF
REF LAB TEST METHOD: ABNORMAL
SP GR UR STRIP: 1.02 (ref 1–1.03)
SP GR UR: 1.01 (ref 1–1.03)
SQUAMOUS #/AREA URNS HPF: ABNORMAL /HPF
UROBILINOGEN UR QL STRIP: ABNORMAL
UROBILINOGEN UR QL: NORMAL
WBC UR QL AUTO: ABNORMAL /HPF

## 2021-10-11 PROCEDURE — 81002 URINALYSIS NONAUTO W/O SCOPE: CPT | Performed by: MIDWIFE

## 2021-10-11 PROCEDURE — 59025 FETAL NON-STRESS TEST: CPT | Performed by: MIDWIFE

## 2021-10-11 PROCEDURE — 81001 URINALYSIS AUTO W/SCOPE: CPT | Performed by: MIDWIFE

## 2021-10-11 PROCEDURE — G0463 HOSPITAL OUTPT CLINIC VISIT: HCPCS

## 2021-10-11 PROCEDURE — 87086 URINE CULTURE/COLONY COUNT: CPT | Performed by: MIDWIFE

## 2021-10-11 PROCEDURE — 59025 FETAL NON-STRESS TEST: CPT

## 2021-10-11 NOTE — PROGRESS NOTES
Chief Complaint  Routine Prenatal Visit (BPP, patient advised she is having a few contractions. )    History of Present Illness:  Britt is a  currently at 38w4d who presents today with complaints of occasional contractions.  Patient had been seen on labor thompson on Tuesday.  Patient was noted to be 2 cm.  Patient does report positive fetal movement.  She denies any vaginal bleeding or spotting.  Patient denies leaking any fluid.    Exam:  Vitals:  See prenatal flowsheet as noted and reviewed  General: Alert, cooperative, and does not appear in any distress  Abdomen:   See prenatal flowsheet as noted and reviewed    Uterus gravid, non-tender; no palpable masses    No guarding or rebound tenderness  Pelvic:  See prenatal flowsheet as noted and reviewed  Ext:  See prenatal flowsheet as noted and reviewed    Moves extremities well, no cyanosis and no redness  Urine:  See prenatal flowsheet as noted and reviewed    Data Review:  The following data was reviewed by: Lady Mcallister MD on 10/08/2021:  Prenatal Labs:  Lab Results   Component Value Date    HGB 10.7 (L) 2021    RUBELLAABIGG 1.63 2021    HEPBSAG Negative 2021    ABO A 2021    RH Positive 2021    ABSCRN Negative 2021    ELN5PZS2 Non Reactive 2021    HEPCVIRUSABY <0.1 2021    STREPGPB Negative 2021    URINECX >100,000 CFU/mL Normal Urogenital Silva 10/05/2021       Admission on 10/05/2021, Discharged on 10/05/2021   Component Date Value   • Color, UA 10/05/2021 Yellow    • Appearance, UA 10/05/2021 Cloudy*   • pH, UA 10/05/2021 7.0    • Specific Gravity, UA 10/05/2021 1.020    • Glucose, UA 10/05/2021 Negative    • Ketones, UA 10/05/2021 Negative    • Bilirubin, UA 10/05/2021 Negative    • Blood, UA 10/05/2021 Negative    • Protein, UA 10/05/2021 Negative    • Leuk Esterase, UA 10/05/2021 Moderate (2+)*   • Nitrite, UA 10/05/2021 Negative    • Urobilinogen, UA 10/05/2021 1.0 E.U./dL    • RBC, UA 10/05/2021  None Seen    • WBC, UA 10/05/2021 6-12*   • Bacteria, UA 10/05/2021 2+*   • Squamous Epithelial Cell* 10/05/2021 21-30*   • Hyaline Casts, UA 10/05/2021 None Seen    • Mucus, UA 10/05/2021 Small/1+*   • Methodology 10/05/2021 Manual Light Microscopy    • Urine Culture 10/05/2021 >100,000 CFU/mL Normal Urogenital Silva    Admission on 2021, Discharged on 2021   Component Date Value   • Color, UA 2021 Yellow    • Appearance, UA 2021 Cloudy*   • pH, UA 2021 8.0    • Specific Gravity, UA 2021 1.012    • Glucose, UA 2021 Negative    • Ketones, UA 2021 Negative    • Bilirubin, UA 2021 Negative    • Blood, UA 2021 Negative    • Protein, UA 2021 Negative    • Leuk Esterase, UA 2021 Moderate (2+)*   • Nitrite, UA 2021 Negative    • Urobilinogen, UA 2021 0.2 E.U./dL    • RBC, UA 2021 0-2*   • WBC, UA 2021 6-12*   • Bacteria, UA 2021 Trace*   • Squamous Epithelial Cell* 2021 3-6*   • Hyaline Casts, UA 2021 None Seen    • Methodology 2021 Manual Light Microscopy    • Urine Culture 2021 <25,000 CFU/mL Mixed Silva Isolated    Admission on 2021, Discharged on 2021   Component Date Value   • Color 2021 Yellow    • Clarity, UA 2021 Cloudy*   • Glucose, UA 2021 Negative    • Bilirubin 2021 Negative    • Ketones, UA 2021 Negative    • Specific Gravity  2021 1.015    • Blood, UA 2021 Negative    • pH, Urine 2021 7.0    • Protein, POC 2021 Negative    • Urobilinogen, UA 2021 Normal    • Leukocytes 2021 Moderate (2+)*   • Nitrite, UA 2021 Negative    • Rupture of Membranes 2021 Negative    Routine Prenatal on 2021   Component Date Value   • Strep Gp B MATTHEW 2021 Negative      Imaging:  US Fetal Biophysical Profile;Without Non-Stress Testing  Britt Dia  : 2000  MRN: [id number]  Date: 10/8/2021    Reason  for exam/History: Polyhydramnios, two-vessel cord    Ultrasound images are reviewed.  There is a single viable intrauterine   pregnancy noted. The fetus was in the vertex presentation.  The fetal   heart rate was normal.      The biophysical profile was 8/8:  2 points for fetal breathing movements,   2 points for fetal tone, 2 points for amniotic fluid volume, and 2 points   for fetal movement.  The KELLI was 24.3 cms.    See official report for measurements and structures identified.    Lady Mcallister MD, [facility]  OB GYN [place]    Medical Records:  None    Assessment and Plan:  Problem List Items Addressed This Visit        Gravid and     Single umbilical artery  Patient is to continue her  testing twice weekly.    Polyhydramnios in third trimester  Patient with continued elevated KELLI at 38 weeks gestation.  Patient also with single umbilical artery.  I discussed with patient induction of labor.  I discussed with patient the risk versus benefits.  Instructions and precautions are given.  Patient is to schedule induction of labor as noted.      Other Visit Diagnoses     Encounter for supervision of high risk pregnancy in third trimester, antepartum    -  Primary  Topics discussed:     induction of labor  iron supplementation  kick counts and fetal movement  labor signs and symptoms  PIH precautions    Unilateral inguinal hernia without obstruction or gangrene, recurrence not specified      Instructions and precautions are given.  Patient is to follow-up with general surgeon post delivery.    Iron deficiency anemia during pregnancy      Patient is to continue her iron supplements as previously given.        Follow Up/Instructions:  Follow up as scheduled.  Patient was given instructions and counseling regarding her condition or for health maintenance advice. Please see specific information pulled into the AVS if appropriate.     Note: Speech recognition transcription software  may have been used to dictate portions of this document.  An attempt at proofreading has been made though minor errors in transcription may still be present.    This note was electronically signed.  Lady Mcallister M.D.

## 2021-10-11 NOTE — NON STRESS TEST
Triage Note - Nursing Documentation  Labor and Delivery Admission Log    Britt Dia  : 2000  MRN: [id number]  CSN: [id number]    Date in / Time in:  10/11/2021  Time in: 1020    Date out / Time out:    Time out: 1112    Nurse: Alicia Ramsey RN    Patient Info: She is a 21 y.o. year old  at 38w4d with an AWILDA of 10/21/2021, by Ultrasound who was seen on the [facility] Labor Gong.    Chief Complaint:   Chief Complaint   Patient presents with   • Non-stress Test     Polihydraminos       Provider Instructions / Disposition:     Patient discharged home with signs/symptoms of labor and fetal movement.  Patient verbalized understanding to instructions and signed.  Patient has scheduled IOL (Elective) 10/13/2020 @1700.  Patient educated if any change in condition; patient to call OB/GYN or return to Labor Gong for assessment/evaluation.  Patient discharged from Labor Gong in stable condition. - Alicia Ramsey RN.    Patient Active Problem List   Diagnosis   • Gastroesophageal reflux disease   • Depressive disorder   • Subchorionic hemorrhage in first trimester   • Single umbilical artery   • Polyhydramnios in third trimester       NST Documentation (Only applicable > 32 weeks): Interpretation A  Nonstress Test Interpretation A: Reactive (10/11/21 1104 : Alicia Ramsey, RN)

## 2021-10-12 LAB — BACTERIA SPEC AEROBE CULT: ABNORMAL

## 2021-10-13 ENCOUNTER — HOSPITAL ENCOUNTER (OUTPATIENT)
Dept: LABOR AND DELIVERY | Facility: HOSPITAL | Age: 21
Discharge: HOME | End: 2021-10-13

## 2021-10-13 ENCOUNTER — HOSPITAL ENCOUNTER (INPATIENT)
Facility: HOSPITAL | Age: 21
LOS: 3 days | Discharge: HOME | End: 2021-10-16
Attending: MIDWIFE | Admitting: OBSTETRICS & GYNECOLOGY

## 2021-10-13 DIAGNOSIS — Z3A.38 38 WEEKS GESTATION OF PREGNANCY: ICD-10-CM

## 2021-10-13 PROBLEM — Z34.90 PREGNANCY: Status: ACTIVE | Noted: 2021-10-13

## 2021-10-13 LAB
ABO GROUP BLD: NORMAL
ABO GROUP BLD: NORMAL
BASOPHILS # BLD AUTO: 0.03 10*3/MM3 (ref 0–0.2)
BASOPHILS NFR BLD AUTO: 0.3 % (ref 0–1.5)
BLD GP AB SCN SERPL QL: NEGATIVE
DEPRECATED RDW RBC AUTO: 44.1 FL (ref 37–54)
EOSINOPHIL # BLD AUTO: 0.09 10*3/MM3 (ref 0–0.4)
EOSINOPHIL NFR BLD AUTO: 0.8 % (ref 0.3–6.2)
ERYTHROCYTE [DISTWIDTH] IN BLOOD BY AUTOMATED COUNT: 15 % (ref 12.3–15.4)
HCT VFR BLD AUTO: 35.3 % (ref 34–46.6)
HGB BLD-MCNC: 11.6 G/DL (ref 12–15.9)
IMM GRANULOCYTES # BLD AUTO: 0.07 10*3/MM3 (ref 0–0.05)
IMM GRANULOCYTES NFR BLD AUTO: 0.6 % (ref 0–0.5)
LYMPHOCYTES # BLD AUTO: 1.63 10*3/MM3 (ref 0.7–3.1)
LYMPHOCYTES NFR BLD AUTO: 15.1 % (ref 19.6–45.3)
MCH RBC QN AUTO: 26.6 PG (ref 26.6–33)
MCHC RBC AUTO-ENTMCNC: 32.9 G/DL (ref 31.5–35.7)
MCV RBC AUTO: 81 FL (ref 79–97)
MONOCYTES # BLD AUTO: 0.82 10*3/MM3 (ref 0.1–0.9)
MONOCYTES NFR BLD AUTO: 7.6 % (ref 5–12)
NEUTROPHILS NFR BLD AUTO: 75.6 % (ref 42.7–76)
NEUTROPHILS NFR BLD AUTO: 8.15 10*3/MM3 (ref 1.7–7)
NRBC BLD AUTO-RTO: 0 /100 WBC (ref 0–0.2)
PLATELET # BLD AUTO: 158 10*3/MM3 (ref 140–450)
PMV BLD AUTO: 10.5 FL (ref 6–12)
RBC # BLD AUTO: 4.36 10*6/MM3 (ref 3.77–5.28)
RH BLD: POSITIVE
RH BLD: POSITIVE
SARS-COV-2 RNA PNL SPEC NAA+PROBE: NOT DETECTED
T&S EXPIRATION DATE: NORMAL
WBC # BLD AUTO: 10.79 10*3/MM3 (ref 3.4–10.8)

## 2021-10-13 PROCEDURE — 86900 BLOOD TYPING SEROLOGIC ABO: CPT

## 2021-10-13 PROCEDURE — 86901 BLOOD TYPING SEROLOGIC RH(D): CPT

## 2021-10-13 PROCEDURE — 86901 BLOOD TYPING SEROLOGIC RH(D): CPT | Performed by: MIDWIFE

## 2021-10-13 PROCEDURE — 51703 INSERT BLADDER CATH COMPLEX: CPT

## 2021-10-13 PROCEDURE — 3E033VJ INTRODUCTION OF OTHER HORMONE INTO PERIPHERAL VEIN, PERCUTANEOUS APPROACH: ICD-10-PCS | Performed by: NURSE PRACTITIONER

## 2021-10-13 PROCEDURE — 59025 FETAL NON-STRESS TEST: CPT

## 2021-10-13 PROCEDURE — 86850 RBC ANTIBODY SCREEN: CPT | Performed by: MIDWIFE

## 2021-10-13 PROCEDURE — 85025 COMPLETE CBC W/AUTO DIFF WBC: CPT | Performed by: OBSTETRICS & GYNECOLOGY

## 2021-10-13 PROCEDURE — 86900 BLOOD TYPING SEROLOGIC ABO: CPT | Performed by: MIDWIFE

## 2021-10-13 PROCEDURE — 59025 FETAL NON-STRESS TEST: CPT | Performed by: MIDWIFE

## 2021-10-13 PROCEDURE — 87635 SARS-COV-2 COVID-19 AMP PRB: CPT | Performed by: MIDWIFE

## 2021-10-13 RX ORDER — HYDROCODONE BITARTRATE AND ACETAMINOPHEN 5; 325 MG/1; MG/1
2 TABLET ORAL ONCE AS NEEDED
Status: DISCONTINUED | OUTPATIENT
Start: 2021-10-13 | End: 2021-10-14 | Stop reason: HOSPADM

## 2021-10-13 RX ORDER — SODIUM CHLORIDE 0.9 % (FLUSH) 0.9 %
1-10 SYRINGE (ML) INJECTION AS NEEDED
Status: DISCONTINUED | OUTPATIENT
Start: 2021-10-13 | End: 2021-10-14

## 2021-10-13 RX ORDER — MISOPROSTOL 200 UG/1
800 TABLET ORAL ONCE AS NEEDED
Status: DISCONTINUED | OUTPATIENT
Start: 2021-10-13 | End: 2021-10-14 | Stop reason: HOSPADM

## 2021-10-13 RX ORDER — MORPHINE SULFATE 4 MG/ML
4 INJECTION, SOLUTION INTRAMUSCULAR; INTRAVENOUS EVERY 4 HOURS PRN
Status: DISCONTINUED | OUTPATIENT
Start: 2021-10-13 | End: 2021-10-14

## 2021-10-13 RX ORDER — OXYTOCIN/0.9 % SODIUM CHLORIDE 30/500 ML
650 PLASTIC BAG, INJECTION (ML) INTRAVENOUS ONCE
Status: DISCONTINUED | OUTPATIENT
Start: 2021-10-13 | End: 2021-10-14 | Stop reason: HOSPADM

## 2021-10-13 RX ORDER — PROMETHAZINE HYDROCHLORIDE 12.5 MG/1
12.5 TABLET ORAL EVERY 6 HOURS PRN
Status: DISCONTINUED | OUTPATIENT
Start: 2021-10-13 | End: 2021-10-14

## 2021-10-13 RX ORDER — ONDANSETRON 4 MG/1
4 TABLET, FILM COATED ORAL ONCE AS NEEDED
Status: DISCONTINUED | OUTPATIENT
Start: 2021-10-13 | End: 2021-10-14 | Stop reason: HOSPADM

## 2021-10-13 RX ORDER — OXYTOCIN/0.9 % SODIUM CHLORIDE 30/500 ML
85 PLASTIC BAG, INJECTION (ML) INTRAVENOUS ONCE
Status: COMPLETED | OUTPATIENT
Start: 2021-10-13 | End: 2021-10-14

## 2021-10-13 RX ORDER — LIDOCAINE HYDROCHLORIDE 10 MG/ML
5 INJECTION, SOLUTION EPIDURAL; INFILTRATION; INTRACAUDAL; PERINEURAL AS NEEDED
Status: DISCONTINUED | OUTPATIENT
Start: 2021-10-13 | End: 2021-10-14

## 2021-10-13 RX ORDER — METHYLERGONOVINE MALEATE 0.2 MG/ML
200 INJECTION INTRAVENOUS ONCE AS NEEDED
Status: DISCONTINUED | OUTPATIENT
Start: 2021-10-13 | End: 2021-10-14 | Stop reason: HOSPADM

## 2021-10-13 RX ORDER — SODIUM CHLORIDE, SODIUM LACTATE, POTASSIUM CHLORIDE, CALCIUM CHLORIDE 600; 310; 30; 20 MG/100ML; MG/100ML; MG/100ML; MG/100ML
125 INJECTION, SOLUTION INTRAVENOUS CONTINUOUS
Status: DISCONTINUED | OUTPATIENT
Start: 2021-10-13 | End: 2021-10-14

## 2021-10-13 RX ORDER — MORPHINE SULFATE 2 MG/ML
6 INJECTION, SOLUTION INTRAMUSCULAR; INTRAVENOUS EVERY 4 HOURS PRN
Status: DISCONTINUED | OUTPATIENT
Start: 2021-10-13 | End: 2021-10-14

## 2021-10-13 RX ORDER — MORPHINE SULFATE 2 MG/ML
2 INJECTION, SOLUTION INTRAMUSCULAR; INTRAVENOUS ONCE AS NEEDED
Status: DISCONTINUED | OUTPATIENT
Start: 2021-10-13 | End: 2021-10-14 | Stop reason: HOSPADM

## 2021-10-13 RX ORDER — ACETAMINOPHEN 325 MG/1
650 TABLET ORAL ONCE AS NEEDED
Status: DISCONTINUED | OUTPATIENT
Start: 2021-10-13 | End: 2021-10-14 | Stop reason: HOSPADM

## 2021-10-13 RX ORDER — MAGNESIUM CARB/ALUMINUM HYDROX 105-160MG
30 TABLET,CHEWABLE ORAL ONCE
Status: DISCONTINUED | OUTPATIENT
Start: 2021-10-13 | End: 2021-10-14

## 2021-10-13 RX ORDER — MORPHINE SULFATE 4 MG/ML
4 INJECTION, SOLUTION INTRAMUSCULAR; INTRAVENOUS ONCE AS NEEDED
Status: DISCONTINUED | OUTPATIENT
Start: 2021-10-13 | End: 2021-10-14 | Stop reason: HOSPADM

## 2021-10-13 RX ORDER — SODIUM CHLORIDE 0.9 % (FLUSH) 0.9 %
10 SYRINGE (ML) INJECTION EVERY 12 HOURS SCHEDULED
Status: DISCONTINUED | OUTPATIENT
Start: 2021-10-13 | End: 2021-10-14

## 2021-10-13 RX ORDER — ONDANSETRON 2 MG/ML
4 INJECTION INTRAMUSCULAR; INTRAVENOUS ONCE AS NEEDED
Status: DISCONTINUED | OUTPATIENT
Start: 2021-10-13 | End: 2021-10-14 | Stop reason: HOSPADM

## 2021-10-13 RX ORDER — OXYTOCIN/0.9 % SODIUM CHLORIDE 30/500 ML
1-20 PLASTIC BAG, INJECTION (ML) INTRAVENOUS
Status: DISCONTINUED | OUTPATIENT
Start: 2021-10-13 | End: 2021-10-14

## 2021-10-13 RX ORDER — PROMETHAZINE HYDROCHLORIDE 12.5 MG/1
12.5 SUPPOSITORY RECTAL EVERY 6 HOURS PRN
Status: DISCONTINUED | OUTPATIENT
Start: 2021-10-13 | End: 2021-10-14

## 2021-10-13 RX ORDER — CARBOPROST TROMETHAMINE 250 UG/ML
250 INJECTION, SOLUTION INTRAMUSCULAR ONCE AS NEEDED
Status: DISCONTINUED | OUTPATIENT
Start: 2021-10-13 | End: 2021-10-14 | Stop reason: HOSPADM

## 2021-10-13 RX ADMIN — SODIUM CHLORIDE, POTASSIUM CHLORIDE, SODIUM LACTATE AND CALCIUM CHLORIDE 125 ML/HR: 600; 310; 30; 20 INJECTION, SOLUTION INTRAVENOUS at 18:27

## 2021-10-13 RX ADMIN — Medication 1 MILLI-UNITS/MIN: at 22:33

## 2021-10-13 NOTE — H&P
[facility]  Britt Dia  : 2000  MRN: [id number]  [facility]: [id number]    History and Physical    Chief Complaint   Patient presents with   • Scheduled Induction      Subjective   Britt Dia is a 21 y.o. year old  with an Estimated Date of Delivery: 10/21/21 currently 38w6d presenting for induction of labor for polyhydramnios and 2V cord.     Risks of labor induction including prolongation of labor, increased risks for both  section and operative vaginal birth have been discussed at length.     Prenatal care has been with [facility].  It has been complicated by polyhydramnios and 2 V cord. First PNV on 3/26/21 @ 10 weeks with 13 visits. Weight gain = 24 lbs.     OB History    Para Term  AB Living   2 1 1 0 0 1   SAB IAB Ectopic Molar Multiple Live Births   0 0 0 0 0 1      # Outcome Date GA Lbr Darnell/2nd Weight Sex Delivery Anes PTL Lv   2 Current            1 Term 10/19/20 39w0d  2483 g (5 lb 7.6 oz)  Vag-Spont  N SHELLI     Past Medical History:   Diagnosis Date   • Chlamydia      Past Surgical History:   Procedure Laterality Date   • NOSE SURGERY         Current Facility-Administered Medications:   •  acetaminophen (TYLENOL) tablet 650 mg, 650 mg, Oral, Once PRN, Lady Mcallister MD  •  carboprost (HEMABATE) injection 250 mcg, 250 mcg, Intramuscular, Once PRN, Lady Mcallister MD  •  HYDROcodone-acetaminophen (NORCO) 5-325 MG per tablet 2 tablet, 2 tablet, Oral, Once PRN, Lady Mcallister MD  •  lactated ringers bolus 1,000 mL, 1,000 mL, Intravenous, Once, Elaine Arias CNM  •  lactated ringers infusion, 125 mL/hr, Intravenous, Continuous, Elaine Arias CNM  •  lidocaine PF 1% (XYLOCAINE) injection 5 mL, 5 mL, Intradermal, PRN, Elaine Arias CNM  •  methylergonovine (METHERGINE) injection 200 mcg, 200 mcg, Intramuscular, Once PRN, Lady Mcallister MD  •  mineral oil liquid 30 mL, 30 mL, Topical, Once, Elaine Arias CNM  •  miSOPROStol (CYTOTEC) tablet 800 mcg,  "800 mcg, Rectal, Once PRN, Lady Mcallister MD  •  Morphine sulfate (PF) injection 2 mg, 2 mg, Intravenous, Once PRN, Lady Mcallister MD  •  Morphine sulfate (PF) injection 4 mg, 4 mg, Intravenous, Q4H PRN, Elaine Arias CNM  •  Morphine sulfate (PF) injection 4 mg, 4 mg, Intravenous, Once PRN, Lady Mcallister MD  •  Morphine sulfate (PF) injection 6 mg, 6 mg, Intravenous, Q4H PRN, Elaine Arias CNM  •  ondansetron (ZOFRAN) tablet 4 mg, 4 mg, Oral, Once PRN **OR** ondansetron (ZOFRAN) injection 4 mg, 4 mg, Intravenous, Once PRN, Lady Mcallister MD  •  oxytocin (PITOCIN) 30 units in 0.9% sodium chloride 500 mL (premix), 1-20 pee-units/min, Intravenous, Titrated, Elaine Arias CNM  •  oxytocin (PITOCIN) 30 units in 0.9% sodium chloride 500 mL (premix), 650 mL/hr, Intravenous, Once **FOLLOWED BY** oxytocin (PITOCIN) 30 units in 0.9% sodium chloride 500 mL (premix), 85 mL/hr, Intravenous, Once, Lady Mcallister MD  •  promethazine (PHENERGAN) suppository 12.5 mg, 12.5 mg, Rectal, Q6H PRN **OR** promethazine (PHENERGAN) tablet 12.5 mg, 12.5 mg, Oral, Q6H PRN, Elaine Arias CNM  •  sodium chloride 0.9 % flush 1-10 mL, 1-10 mL, Intravenous, PRN, Elaine Arias CNM  •  sodium chloride 0.9 % flush 10 mL, 10 mL, Intravenous, Q12H, Elaine Arias CNM    No Known Allergies  Social History    Tobacco Use      Smoking status: Never Smoker      Smokeless tobacco: Never Used      Tobacco comment: quit 4 years ago    Review of Systems   Constitutional: Negative.    Respiratory: Negative.    Cardiovascular: Negative.    Gastrointestinal: Negative.    Genitourinary: Negative.    Musculoskeletal: Negative.    Psychiatric/Behavioral: Negative.    All other systems reviewed and are negative.        Objective   /73 (BP Location: Left arm, Patient Position: Lying)   Pulse 116   Temp 98.6 °F (37 °C) (Oral)   Resp 18   Ht 154.9 cm (61\")   Wt 67.6 kg (149 lb)   LMP 01/15/2021   SpO2 96%   Breastfeeding No   " BMI 28.15 kg/m²                                           General:  well developed; well nourished  no acute distress   Neck:    Heart:   supple and no masses  normal apical impulse  regular rate and rhythm   Lungs: breathing is unlabored  clear to auscultation bilaterally   Abdomen: soft, non-tender; no masses  gravid  EFW: 6.5#, Growth scan @ 36 weeks 53%   FHT's: reassuring, reactive and category 1   Cervix: was checked (by me): 2-3 cm / 50 % / -3, soft posterior   Presentation: cephalic   Contractions: irregular - external monitors used   Extremities:   normal appearance with no cyanosis or edema and no calf tenderness   Skin:  Skin color, texture, turgor normal. No rashes or lesions or Skin warm and dry   Psych:  Normal. and Alert and oriented, appropriate affect.       Prenatal Labs  Lab Results   Component Value Date    HGB 10.7 (L) 2021    HEPBSAG Negative 2021    ABSCRN Negative 2021    WPM4TXB7 Non Reactive 2021    HEPCVIRUSABY <0.1 2021    STREPGPB Negative 2021    URINECX >100,000 CFU/mL Mixed Gram Positive Silva (A) 10/11/2021       Current Labs Reviewed   Lab Results (last 24 hours)     ** No results found for the last 24 hours. **             ASSESSMENT  1. IUP at 38w6d  2. Induction of labor because of Polyhydramnios and 2V cord  3. Group B strep status: negative  4. Reactive NST    PLAN  1. Admit to   2. Low dose Pitocin induction, increase per protocol @ 0500  3. All procedures explained to patient and partner. Questions answered and verbalized understanding.  4. Epidural when needed  5. Anticipate     Elaine Arias, APRN  10/13/2021  18:12 EDT

## 2021-10-13 NOTE — NON STRESS TEST
Britt Dia, a  at 38w6d with an AWILDA of 10/21/2021, by Ultrasound, was seen at [facility] for a nonstress test.    Chief Complaint   Patient presents with   • Scheduled Induction       Patient Active Problem List   Diagnosis   • Gastroesophageal reflux disease   • Depressive disorder   • Subchorionic hemorrhage in first trimester   • Single umbilical artery   • Polyhydramnios in third trimester   • Pregnancy       Start Time:   Stop Time:     Interpretation A  Nonstress Test Interpretation A: Reactive (10/13/21 1847 : Bhavya Wang, RN)

## 2021-10-14 PROCEDURE — 59410 OBSTETRICAL CARE: CPT | Performed by: NURSE PRACTITIONER

## 2021-10-14 PROCEDURE — P9612 CATHETERIZE FOR URINE SPEC: HCPCS

## 2021-10-14 PROCEDURE — 59025 FETAL NON-STRESS TEST: CPT

## 2021-10-14 RX ORDER — PRENATAL VIT/IRON FUM/FOLIC AC 27MG-0.8MG
1 TABLET ORAL DAILY
Status: DISCONTINUED | OUTPATIENT
Start: 2021-10-15 | End: 2021-10-16 | Stop reason: HOSPADM

## 2021-10-14 RX ORDER — BISACODYL 10 MG
10 SUPPOSITORY, RECTAL RECTAL DAILY PRN
Status: DISCONTINUED | OUTPATIENT
Start: 2021-10-15 | End: 2021-10-16 | Stop reason: HOSPADM

## 2021-10-14 RX ORDER — DIPHENHYDRAMINE HCL 25 MG
25 CAPSULE ORAL NIGHTLY PRN
Status: DISCONTINUED | OUTPATIENT
Start: 2021-10-14 | End: 2021-10-16 | Stop reason: HOSPADM

## 2021-10-14 RX ORDER — EPHEDRINE SULFATE 5 MG/ML
5 INJECTION INTRAVENOUS
Status: DISCONTINUED | OUTPATIENT
Start: 2021-10-14 | End: 2021-10-14 | Stop reason: HOSPADM

## 2021-10-14 RX ORDER — PROMETHAZINE HYDROCHLORIDE 25 MG/1
25 TABLET ORAL EVERY 6 HOURS PRN
Status: DISCONTINUED | OUTPATIENT
Start: 2021-10-14 | End: 2021-10-16 | Stop reason: HOSPADM

## 2021-10-14 RX ORDER — PROMETHAZINE HYDROCHLORIDE 12.5 MG/1
12.5 SUPPOSITORY RECTAL EVERY 6 HOURS PRN
Status: DISCONTINUED | OUTPATIENT
Start: 2021-10-14 | End: 2021-10-16 | Stop reason: HOSPADM

## 2021-10-14 RX ORDER — IBUPROFEN 800 MG/1
800 TABLET ORAL EVERY 8 HOURS SCHEDULED
Status: DISCONTINUED | OUTPATIENT
Start: 2021-10-14 | End: 2021-10-16 | Stop reason: HOSPADM

## 2021-10-14 RX ORDER — HYDROCODONE BITARTRATE AND ACETAMINOPHEN 7.5; 325 MG/1; MG/1
1 TABLET ORAL EVERY 4 HOURS PRN
Status: DISCONTINUED | OUTPATIENT
Start: 2021-10-14 | End: 2021-10-16 | Stop reason: HOSPADM

## 2021-10-14 RX ORDER — ONDANSETRON 2 MG/ML
4 INJECTION INTRAMUSCULAR; INTRAVENOUS EVERY 6 HOURS PRN
Status: DISCONTINUED | OUTPATIENT
Start: 2021-10-14 | End: 2021-10-16 | Stop reason: HOSPADM

## 2021-10-14 RX ORDER — SODIUM CHLORIDE 0.9 % (FLUSH) 0.9 %
1-10 SYRINGE (ML) INJECTION AS NEEDED
Status: DISCONTINUED | OUTPATIENT
Start: 2021-10-14 | End: 2021-10-16 | Stop reason: HOSPADM

## 2021-10-14 RX ORDER — ONDANSETRON 2 MG/ML
4 INJECTION INTRAMUSCULAR; INTRAVENOUS ONCE AS NEEDED
Status: DISCONTINUED | OUTPATIENT
Start: 2021-10-14 | End: 2021-10-14 | Stop reason: HOSPADM

## 2021-10-14 RX ORDER — ONDANSETRON 4 MG/1
4 TABLET, FILM COATED ORAL EVERY 8 HOURS PRN
Status: DISCONTINUED | OUTPATIENT
Start: 2021-10-14 | End: 2021-10-16 | Stop reason: HOSPADM

## 2021-10-14 RX ORDER — DOCUSATE SODIUM 100 MG/1
100 CAPSULE, LIQUID FILLED ORAL 2 TIMES DAILY
Status: DISCONTINUED | OUTPATIENT
Start: 2021-10-14 | End: 2021-10-16 | Stop reason: HOSPADM

## 2021-10-14 RX ORDER — HYDROCODONE BITARTRATE AND ACETAMINOPHEN 5; 325 MG/1; MG/1
1 TABLET ORAL EVERY 4 HOURS PRN
Status: DISCONTINUED | OUTPATIENT
Start: 2021-10-14 | End: 2021-10-16 | Stop reason: HOSPADM

## 2021-10-14 RX ORDER — HYDROCORTISONE 25 MG/G
1 CREAM TOPICAL AS NEEDED
Status: DISCONTINUED | OUTPATIENT
Start: 2021-10-14 | End: 2021-10-16 | Stop reason: HOSPADM

## 2021-10-14 RX ORDER — LANOLIN
CREAM (GRAM) TOPICAL
Status: DISCONTINUED | OUTPATIENT
Start: 2021-10-14 | End: 2021-10-16 | Stop reason: HOSPADM

## 2021-10-14 RX ORDER — TRISODIUM CITRATE DIHYDRATE AND CITRIC ACID MONOHYDRATE 500; 334 MG/5ML; MG/5ML
30 SOLUTION ORAL ONCE
Status: DISCONTINUED | OUTPATIENT
Start: 2021-10-14 | End: 2021-10-14 | Stop reason: HOSPADM

## 2021-10-14 RX ADMIN — SODIUM CHLORIDE, POTASSIUM CHLORIDE, SODIUM LACTATE AND CALCIUM CHLORIDE 125 ML/HR: 600; 310; 30; 20 INJECTION, SOLUTION INTRAVENOUS at 01:36

## 2021-10-14 RX ADMIN — Medication 85 ML/HR: at 14:54

## 2021-10-14 NOTE — PROGRESS NOTES
"[facility]  Obstetric Progress Note    Subjective     Patient:    The patient feels the contractions some.      Objective     Vital Signs Range for the last 24 hours  Temp:  [97.8 °F (36.6 °C)-98.6 °F (37 °C)] 97.8 °F (36.6 °C)   Temp src: Oral   BP: ()/(46-74) 105/67   Heart Rate:  [] 74   Resp:  [18] 18   SpO2:  [95 %-97 %] 95 %           Weight:  [67.6 kg (149 lb)] 67.6 kg (149 lb)       Flowsheet Rows      First Filed Value   Admission Height 154.9 cm (61\") Documented at 10/13/2021 1803   Admission Weight 67.6 kg (149 lb) Documented at 10/13/2021 1803          Intake/Output last 24 hours:      Intake/Output Summary (Last 24 hours) at 10/14/2021 0810  Last data filed at 10/14/2021 0743  Gross per 24 hour   Intake --   Output 300 ml   Net -300 ml       Intake/Output this shift:    I/O this shift:  In: -   Out: 300 [Urine:300]    Physical Exam:  /67   Pulse 74   Temp 97.8 °F (36.6 °C) (Oral)   Resp 18   Ht 154.9 cm (61\")   Wt 67.6 kg (149 lb)   LMP 01/15/2021   SpO2 95%   Breastfeeding No   BMI 28.15 kg/m²    General Appearance: relaxed / pleasant / no distress  Lungs: breathing unlabored   Abdomen:  Ctx's q 2-3                      FHT's 135 + accels and variability   V/E:  3/70%-2 well applied - membranes nicked for slow leak - fluid clear                Assessment:  1.  Induction for polyhydramnios   2.  FHR cat 1        Plan:  Epidural if desires   Anticipate  later today  Encouraged questions and answered      Neela Gonzalez CNM  10/14/2021  08:10 EDT    "

## 2021-10-14 NOTE — PLAN OF CARE
Problem: Adult Inpatient Plan of Care  Goal: Plan of Care Review  Outcome: Ongoing, Progressing  Flowsheets (Taken 10/14/2021 0444)  Outcome Summary: VSS, FHTs WNLs, voices no complains.  Goal: Patient-Specific Goal (Individualized)  Outcome: Ongoing, Progressing  Goal: Absence of Hospital-Acquired Illness or Injury  Outcome: Ongoing, Progressing  Goal: Optimal Comfort and Wellbeing  Outcome: Ongoing, Progressing  Goal: Readiness for Transition of Care  Outcome: Ongoing, Progressing     Problem: Bleeding (Labor)  Goal: Hemostasis  Outcome: Ongoing, Progressing     Problem: Change in Fetal Wellbeing (Labor)  Goal: Stable Fetal Wellbeing  Outcome: Ongoing, Progressing     Problem: Delayed Labor Progression (Labor)  Goal: Effective Progression to Delivery  Outcome: Ongoing, Progressing     Problem: Infection (Labor)  Goal: Absence of Infection Signs and Symptoms  Outcome: Ongoing, Progressing     Problem: Labor Pain (Labor)  Goal: Acceptable Pain Control  Outcome: Ongoing, Progressing     Problem: Uterine Tachysystole (Labor)  Goal: Normal Uterine Contraction Pattern  Outcome: Ongoing, Progressing   Goal Outcome Evaluation:              Outcome Summary: VSS, FHTs WNLs, voices no complains.

## 2021-10-14 NOTE — PROGRESS NOTES
1045  Awaiting epidural   Pt in knee chest position  Ctx's q 2 min  Pit at 2 mu  FHR's reassuring   V/E 5/100%/-2 cephalic

## 2021-10-15 LAB
BASOPHILS # BLD AUTO: 0.05 10*3/MM3 (ref 0–0.2)
BASOPHILS NFR BLD AUTO: 0.3 % (ref 0–1.5)
DEPRECATED RDW RBC AUTO: 46.1 FL (ref 37–54)
EOSINOPHIL # BLD AUTO: 0.11 10*3/MM3 (ref 0–0.4)
EOSINOPHIL NFR BLD AUTO: 0.8 % (ref 0.3–6.2)
ERYTHROCYTE [DISTWIDTH] IN BLOOD BY AUTOMATED COUNT: 15.6 % (ref 12.3–15.4)
HCT VFR BLD AUTO: 33.5 % (ref 34–46.6)
HGB BLD-MCNC: 10.8 G/DL (ref 12–15.9)
IMM GRANULOCYTES # BLD AUTO: 0.08 10*3/MM3 (ref 0–0.05)
IMM GRANULOCYTES NFR BLD AUTO: 0.5 % (ref 0–0.5)
LYMPHOCYTES # BLD AUTO: 2.32 10*3/MM3 (ref 0.7–3.1)
LYMPHOCYTES NFR BLD AUTO: 15.9 % (ref 19.6–45.3)
MCH RBC QN AUTO: 26.3 PG (ref 26.6–33)
MCHC RBC AUTO-ENTMCNC: 32.2 G/DL (ref 31.5–35.7)
MCV RBC AUTO: 81.5 FL (ref 79–97)
MONOCYTES # BLD AUTO: 1.29 10*3/MM3 (ref 0.1–0.9)
MONOCYTES NFR BLD AUTO: 8.9 % (ref 5–12)
NEUTROPHILS NFR BLD AUTO: 10.7 10*3/MM3 (ref 1.7–7)
NEUTROPHILS NFR BLD AUTO: 73.6 % (ref 42.7–76)
NRBC BLD AUTO-RTO: 0 /100 WBC (ref 0–0.2)
PLATELET # BLD AUTO: 166 10*3/MM3 (ref 140–450)
PMV BLD AUTO: 10.8 FL (ref 6–12)
RBC # BLD AUTO: 4.11 10*6/MM3 (ref 3.77–5.28)
WBC # BLD AUTO: 14.55 10*3/MM3 (ref 3.4–10.8)

## 2021-10-15 PROCEDURE — 25010000002 TDAP 5-2.5-18.5 LF-MCG/0.5 SUSPENSION: Performed by: NURSE PRACTITIONER

## 2021-10-15 PROCEDURE — 90471 IMMUNIZATION ADMIN: CPT | Performed by: NURSE PRACTITIONER

## 2021-10-15 PROCEDURE — 85025 COMPLETE CBC W/AUTO DIFF WBC: CPT | Performed by: NURSE PRACTITIONER

## 2021-10-15 PROCEDURE — G0008 ADMIN INFLUENZA VIRUS VAC: HCPCS | Performed by: NURSE PRACTITIONER

## 2021-10-15 PROCEDURE — 0 INFLUENZA VAC SPLIT QUAD 0.5 ML SUSPENSION PREFILLED SYRINGE: Performed by: NURSE PRACTITIONER

## 2021-10-15 PROCEDURE — 90715 TDAP VACCINE 7 YRS/> IM: CPT | Performed by: NURSE PRACTITIONER

## 2021-10-15 PROCEDURE — 25010000002 INFLUENZA VAC SPLIT QUAD 0.5 ML SUSPENSION PREFILLED SYRINGE: Performed by: NURSE PRACTITIONER

## 2021-10-15 PROCEDURE — 90686 IIV4 VACC NO PRSV 0.5 ML IM: CPT | Performed by: NURSE PRACTITIONER

## 2021-10-15 RX ADMIN — IBUPROFEN 800 MG: 800 TABLET ORAL at 18:32

## 2021-10-15 RX ADMIN — IBUPROFEN 800 MG: 800 TABLET ORAL at 10:55

## 2021-10-15 RX ADMIN — DOCUSATE SODIUM 100 MG: 100 CAPSULE, LIQUID FILLED ORAL at 20:42

## 2021-10-15 RX ADMIN — TETANUS TOXOID, REDUCED DIPHTHERIA TOXOID AND ACELLULAR PERTUSSIS VACCINE, ADSORBED 0.5 ML: 5; 2.5; 8; 8; 2.5 SUSPENSION INTRAMUSCULAR at 22:09

## 2021-10-15 RX ADMIN — DOCUSATE SODIUM 100 MG: 100 CAPSULE, LIQUID FILLED ORAL at 02:44

## 2021-10-15 RX ADMIN — IBUPROFEN 800 MG: 800 TABLET ORAL at 02:41

## 2021-10-15 RX ADMIN — INFLUENZA VIRUS VACCINE 0.5 ML: 15; 15; 15; 15 SUSPENSION INTRAMUSCULAR at 22:11

## 2021-10-15 RX ADMIN — DOCUSATE SODIUM 100 MG: 100 CAPSULE, LIQUID FILLED ORAL at 09:00

## 2021-10-15 RX ADMIN — PRENATAL VITAMINS-IRON FUMARATE 27 MG IRON-FOLIC ACID 0.8 MG TABLET 1 TABLET: at 09:00

## 2021-10-15 NOTE — PLAN OF CARE
Goal Outcome Evaluation:  Plan of Care Reviewed With: patient, significant other        Progress: improving  Outcome Summary: VSS ambulating indepently, pain controlled with oral medication.

## 2021-10-15 NOTE — PLAN OF CARE
Goal Outcome Evaluation:  Plan of Care Reviewed With: patient           Outcome Summary: VSS. Tolerating diet. Ambulating without difficulty. Lochia WNL. Pain controlled with pain meds. + bonding with infant. Will continue with routine PP care.

## 2021-10-15 NOTE — PROGRESS NOTES
[facility]    POSTPARTUM DAY 1  Late entry - pt seen earlier today    Subjective     Patient reports:     Doing well.    Voiding and ambulating without difficulty.    Reg diet & tolerating.    Lochia not heavy.       Baby is doing well    Objective      Vitals: Vital Signs Range for the last 24 hours  Temperature: Temp:  [98.1 °F (36.7 °C)-98.6 °F (37 °C)] 98.5 °F (36.9 °C)   Temp Source: Temp src: Temporal   BP: BP: ()/(50-70) 93/57   Pulse: Heart Rate:  [72-95] 95   Respirations: Resp:  [16-18] 18   SPO2: SpO2:  [98 %-99 %] 99 %   O2 Amount (l/min):     O2 Devices Device (Oxygen Therapy): room air   Weight:                PHYSICAL EXAM:    General: no acute distress  Lungs:  breathing is unlabored  Abdomen: fundus firm - below the umbilicus  Lower Extremities: no calf tenderness                                                                                                                        I reviewed the patient's new clinical results.    @ASSESSMENT PLANBEGIN@       Pregnancy      Assessment:    Britt Dia is Day 1  post-partum vaginal delivery  Vaginal, Spontaneous   .      anemia  Plan: Iron BID - Continue PP Orders.  Anticipate D/C home tomorrow.    Neela Gonzalez CNM  10/15/2021  19:56 EDT

## 2021-10-15 NOTE — PROGRESS NOTES
"Patient: Britt Dia  * No surgery found *  Anesthesia type: Anesthesia type cannot be found on the log.    Patient location: Labor and Delivery  Last vitals: /64 (BP Location: Right arm, Patient Position: Lying)   Pulse 92   Temp 98.1 °F (36.7 °C) (Oral)   Resp 18   Ht 154.9 cm (61\")   Wt 67.6 kg (149 lb)   LMP 01/15/2021   SpO2 99%   Breastfeeding Unknown   BMI 28.15 kg/m²   Level of consciousness: awake, alert and oriented    Post-anesthesia pain: adequate analgesia  Airway patency: patent  Respiratory: unassisted  Cardiovascular: stable and blood pressure at baseline  Hydration: euvolemic    Anesthetic complications: no     " Using otc claritin- will renew fluticasone

## 2021-10-16 VITALS
RESPIRATION RATE: 18 BRPM | SYSTOLIC BLOOD PRESSURE: 107 MMHG | HEIGHT: 61 IN | WEIGHT: 149 LBS | OXYGEN SATURATION: 99 % | BODY MASS INDEX: 28.13 KG/M2 | HEART RATE: 101 BPM | DIASTOLIC BLOOD PRESSURE: 65 MMHG | TEMPERATURE: 98.3 F

## 2021-10-16 PROCEDURE — 0503F POSTPARTUM CARE VISIT: CPT | Performed by: NURSE PRACTITIONER

## 2021-10-16 RX ORDER — DOCUSATE SODIUM 100 MG/1
100 CAPSULE, LIQUID FILLED ORAL 2 TIMES DAILY
Qty: 60 CAPSULE | Refills: 0 | Status: SHIPPED | OUTPATIENT
Start: 2021-10-16 | End: 2021-11-22

## 2021-10-16 RX ORDER — IBUPROFEN 800 MG/1
800 TABLET ORAL EVERY 8 HOURS PRN
Qty: 40 TABLET | Refills: 0 | Status: SHIPPED | OUTPATIENT
Start: 2021-10-16 | End: 2021-10-16 | Stop reason: HOSPADM

## 2021-10-16 RX ORDER — IBUPROFEN 800 MG/1
800 TABLET ORAL EVERY 8 HOURS PRN
Qty: 40 TABLET | Refills: 0 | Status: SHIPPED | OUTPATIENT
Start: 2021-10-16 | End: 2021-11-22

## 2021-10-16 RX ORDER — ACETAMINOPHEN 500 MG
1000 TABLET ORAL EVERY 8 HOURS
Qty: 60 TABLET | Refills: 0 | Status: SHIPPED | OUTPATIENT
Start: 2021-10-16 | End: 2021-10-16 | Stop reason: HOSPADM

## 2021-10-16 RX ORDER — DOCUSATE SODIUM 100 MG/1
100 CAPSULE, LIQUID FILLED ORAL 2 TIMES DAILY
Qty: 60 CAPSULE | Refills: 0 | Status: SHIPPED | OUTPATIENT
Start: 2021-10-16 | End: 2021-10-16 | Stop reason: HOSPADM

## 2021-10-16 RX ORDER — ACETAMINOPHEN 500 MG
1000 TABLET ORAL EVERY 8 HOURS
Qty: 60 TABLET | Refills: 0 | Status: SHIPPED | OUTPATIENT
Start: 2021-10-16 | End: 2021-11-22

## 2021-10-16 RX ADMIN — IBUPROFEN 800 MG: 800 TABLET ORAL at 09:08

## 2021-10-16 RX ADMIN — PRENATAL VITAMINS-IRON FUMARATE 27 MG IRON-FOLIC ACID 0.8 MG TABLET 1 TABLET: at 09:08

## 2021-10-16 RX ADMIN — IBUPROFEN 800 MG: 800 TABLET ORAL at 01:07

## 2021-10-16 NOTE — PLAN OF CARE
Goal Outcome Evaluation:  Plan of Care Reviewed With: patient, significant other        Progress: improving  Outcome Summary: VSS ambulating without diggiculty, pain controllled with oral pain medicatiojn.

## 2021-10-16 NOTE — L&D DELIVERY NOTE
[facility]  Vaginal Delivery Note    Delivery     Delivery: Vaginal, Spontaneous     YOB: 2020    Time of Birth:  Gestational Age 11:40 AM   39w0d     Anesthesia: None     Delivering clinician: Neela Gonzalez CNM   Forceps?   No   Vacuum? No    Shoulder dystocia present: No        Delivery narrative: Britt progressed and pushed well.  OA to KEREN - despite gentle downward traction, maternal pushing effort, and Will position, anterior shoulder not delivered. With suprapubic pressure, maternal pushing effort and gentle downward traction the fetal right arm (tight & completely around the fetal neck) delivered without difficulty followed by the anterior and posterior shoulder and infant. Infant bulb suctioned, notable good upper extremity movement, infant to mom's abdomen and dried. Delayed cord clamping x60 seconds, double clamped and cut per father the baby. Spontaneous delivery of the placenta intact 20 units of Pitocin to IV fluids, fundus firm with massage. Vaginal inspection intact. Estimated blood loss 300 cc.       Infant    Findings: male  infant     Infant observations: Weight: 3317 g (7 lb 5 oz)   Length: 20  in  Observations/Comments:        Apgars: 8  @ 1 minute /    9  @ 5 minutes   Infant Name: Héctor Guzman     Placenta, Cord, and Fluid    Placenta delivered  Spontaneous  at   10/14/2021 11:43 AM     Cord: 2 vessels  present.   Nuchal Cord?  no   Cord blood obtained: Yes    Cord gases obtained:  No    Cord gas results: Venous:  No results found for: PHCVEN    Arterial:  No results found for: PHCART     Repair    Episiotomy: No      Lacerations: No   Estimated Blood Loss:             Complications  None -  (though anticipated shoulder dystocia) findings were tight nuchal arm easily delivered with suprapubic pressure x 1    Disposition  Mom and infant stable and bonding.      Neela Gonzalez CNM    Delivery date 10-    (Late entry on 10/16/21)  08:50 EDT

## 2021-11-22 ENCOUNTER — POSTPARTUM VISIT (OUTPATIENT)
Dept: OBSTETRICS AND GYNECOLOGY | Facility: CLINIC | Age: 21
End: 2021-11-22

## 2021-11-22 VITALS — BODY MASS INDEX: 25.13 KG/M2 | DIASTOLIC BLOOD PRESSURE: 66 MMHG | SYSTOLIC BLOOD PRESSURE: 108 MMHG | WEIGHT: 133 LBS

## 2021-11-22 DIAGNOSIS — N89.8 VAGINAL DISCHARGE: Primary | ICD-10-CM

## 2021-11-22 PROCEDURE — 0503F POSTPARTUM CARE VISIT: CPT | Performed by: NURSE PRACTITIONER

## 2021-11-22 NOTE — PROGRESS NOTES
Neela Gonzalez CNM  2021    Subjective   Chief Complaint   Patient presents with   • Postpartum Care     patient had vaginal delivery 10/14/21 by Osbaldo Gonzalez, Patient is bottlefeeding, patient is interested in Nexplanon for birthcontrol, No Complaints/concerns      Britt Dia is a 21 y.o. year old  presenting to be seen for her postpartum visit.  She had a vaginal delivery.  She does think    Since delivery she has been sexually active.  She does not have concerns about post-partum blues/depression.   She is bottle feeding.  For ongoing contraception, her plans are Nexplanon    The following portions of the patient's history were reviewed and updated as appropriate:current medications and allergies    Social History    Tobacco Use      Smoking status: Never Smoker      Smokeless tobacco: Never Used      Tobacco comment: quit 4 years ago      Review of Systems  Pertinent items are noted in HPI, all other systems reviewed and negative    Objective     Physical Exam    General: Pleasant    Abdomen   The abdomen is soft and non tender.   Genitourinary   External genitalia:  normal appearance -    Vaginal:  normal pink mucosa without lesions.  Cervix:   neg CMT  Uterus:  normal size, shape and consistency.  Adnexa:  normal bimanual exam of the adnexa  Extremities  Full ROM   Psychiatric  The patient is oriented to person, place, and time. Speech is fluent and words are clear.          Assessment   Normal 6 week postpartum exam  Abnormal d/c   Desires nexplanon     Plan   Pap smear due age 22 - schedule for  awareness &/or monthly exams  Kegels daily  All BC options reviewed     Meds prescribed - desires LARC -  Info on nexplanon & mirena - must be on menses prior to insertion                                        Call with choice     No orders of the defined types were placed in this encounter.         This note was electronically signed.  Neela Gonzalez CNM

## 2021-11-24 LAB
A VAGINAE DNA VAG QL NAA+PROBE: ABNORMAL SCORE
BVAB2 DNA VAG QL NAA+PROBE: ABNORMAL SCORE
C ALBICANS DNA VAG QL NAA+PROBE: NEGATIVE
C GLABRATA DNA VAG QL NAA+PROBE: NEGATIVE
C TRACH DNA VAG QL NAA+PROBE: NEGATIVE
MEGA1 DNA VAG QL NAA+PROBE: ABNORMAL SCORE
N GONORRHOEA DNA VAG QL NAA+PROBE: NEGATIVE
T VAGINALIS DNA VAG QL NAA+PROBE: NEGATIVE

## 2021-12-03 RX ORDER — METRONIDAZOLE 500 MG/1
500 TABLET ORAL 3 TIMES DAILY
Qty: 21 TABLET | Refills: 0 | Status: SHIPPED | OUTPATIENT
Start: 2021-12-03 | End: 2021-12-14

## 2022-01-27 ENCOUNTER — OFFICE VISIT (OUTPATIENT)
Dept: OBSTETRICS AND GYNECOLOGY | Facility: CLINIC | Age: 22
End: 2022-01-27

## 2022-01-27 VITALS
SYSTOLIC BLOOD PRESSURE: 120 MMHG | HEIGHT: 61 IN | DIASTOLIC BLOOD PRESSURE: 62 MMHG | WEIGHT: 119 LBS | BODY MASS INDEX: 22.47 KG/M2

## 2022-01-27 DIAGNOSIS — Z30.017 NEXPLANON INSERTION: Primary | ICD-10-CM

## 2022-01-27 PROBLEM — Z97.5 NEXPLANON IN PLACE: Status: ACTIVE | Noted: 2022-01-27

## 2022-01-27 PROCEDURE — 11981 INSERTION DRUG DLVR IMPLANT: CPT | Performed by: OBSTETRICS & GYNECOLOGY

## 2022-01-27 RX ORDER — ETONOGESTREL 68 MG/1
IMPLANT SUBCUTANEOUS
COMMUNITY
Start: 2021-12-21

## 2022-01-27 NOTE — PROGRESS NOTES
Nexplanon Insertion    Patient's last menstrual period was 01/23/2022.    Date of procedure:  1/27/2022    Risks and benefits discussed? yes  All questions answered? yes  Consents given by the patient  Written consent obtained? yes    Local anesthesia used:  yes - 2 cc's of  Meds; anesthesia local: 1% lidocaine with epinephrine    Procedure documentation:    The upper right arm (non-dominant) was marked at the intended site of insertion. An alcohol wipe was used to cleanse the skin.  Local anesthesia was injected. The arm was further cleaned with betadine. The Nexplanon was placed subdermally without difficulty.  The device was able to be palpated in the arm by both myself and Britt.  Steri-strips were then placed across the site of insertion and the arm was wrapped.    She tolerated the procedure well.  There were no complications.  EBL was minimal.    Post procedure instructions: Remove the wrapping in 24 hours and the steri-strips in 5 days.  The patient has been advised to contact the office if she develops fever, redness, swelling, pain, or discharge occurs at the procedure site.  She has been counseled on the effectiveness of her contraception as well.    Follow up needed: SHAHIDA Resendiz MD  Obstetrics and Gynecology  [facility]

## 2022-02-15 ENCOUNTER — LAB (OUTPATIENT)
Dept: LAB | Facility: HOSPITAL | Age: 22
End: 2022-02-15

## 2022-02-15 ENCOUNTER — TELEPHONE (OUTPATIENT)
Dept: OBSTETRICS AND GYNECOLOGY | Facility: CLINIC | Age: 22
End: 2022-02-15

## 2022-02-15 DIAGNOSIS — Z32.00 POSSIBLE PREGNANCY, NOT CONFIRMED: Primary | ICD-10-CM

## 2022-02-15 DIAGNOSIS — Z32.00 POSSIBLE PREGNANCY, NOT CONFIRMED: ICD-10-CM

## 2022-02-15 PROCEDURE — 36415 COLL VENOUS BLD VENIPUNCTURE: CPT

## 2022-02-15 PROCEDURE — 84702 CHORIONIC GONADOTROPIN TEST: CPT

## 2022-02-15 NOTE — TELEPHONE ENCOUNTER
Okay to give order for serum hCG level as noted.  
Patient wants to come in for HCG   
Pt informed  
24 yo F BIBA for AMS. Pt noted to be tearful and agitated upon arrival. Pt admits to etoh use today. Pt has no sign of trauma noted to head or body at this time. Pt stable, will continue to monitor.

## 2022-02-16 LAB — HCG INTACT+B SERPL-ACNC: <0.5 MIU/ML

## 2022-04-13 ENCOUNTER — OFFICE VISIT (OUTPATIENT)
Dept: OBSTETRICS AND GYNECOLOGY | Facility: CLINIC | Age: 22
End: 2022-04-13

## 2022-04-13 VITALS — WEIGHT: 118 LBS | BODY MASS INDEX: 22.3 KG/M2 | DIASTOLIC BLOOD PRESSURE: 60 MMHG | SYSTOLIC BLOOD PRESSURE: 94 MMHG

## 2022-04-13 DIAGNOSIS — Z01.419 ENCOUNTER FOR GYNECOLOGICAL EXAMINATION WITHOUT ABNORMAL FINDING: Primary | ICD-10-CM

## 2022-04-13 PROCEDURE — 99395 PREV VISIT EST AGE 18-39: CPT | Performed by: OBSTETRICS & GYNECOLOGY

## 2022-04-13 RX ORDER — METRONIDAZOLE 500 MG/1
500 TABLET ORAL 2 TIMES DAILY
Qty: 14 TABLET | Refills: 0 | Status: SHIPPED | OUTPATIENT
Start: 2022-04-13 | End: 2022-04-20

## 2022-04-13 NOTE — PROGRESS NOTES
Subjective   Chief Complaint   Patient presents with   • Gynecologic Exam     Yearly exam and pap smear     Britt Dia is a 22 y.o. year old .  Patient's last menstrual period was 2022 (approximate).  She presents to be seen because of annaul exam. .   Patient has slight burning odorous discharge for couple weeks.  Nexplanon for couple months.  OTHER COMPLAINTS:  Nothing else    The following portions of the patient's history were reviewed and updated as appropriate:  She  has a past medical history of Chlamydia.  She does not have any pertinent problems on file.  She  has a past surgical history that includes Nose surgery.  Her family history includes Cancer in her father.  She  reports that she has never smoked. She has never used smokeless tobacco. She reports that she does not drink alcohol and does not use drugs.  Current Outpatient Medications   Medication Sig Dispense Refill   • Nexplanon 68 MG implant subdermal implant        No current facility-administered medications for this visit.     Current Outpatient Medications on File Prior to Visit   Medication Sig   • Nexplanon 68 MG implant subdermal implant      No current facility-administered medications on file prior to visit.     She has No Known Allergies.    Social History    Tobacco Use      Smoking status: Never Smoker      Smokeless tobacco: Never Used      Tobacco comment: quit 4 years ago    Review of Systems  Consitutional POS: nothing reported    NEG: anorexia or night sweats   Gastointestinal POS: nothing reported    NEG: bloating, change in bowel habits, melena or reflux symptoms   Genitourinary POS: nothing reported    NEG: dysuria or hematuria   Integument POS: nothing reported    NEG: moles that are changing in size, shape, color or rashes   Breast POS: nothing reported    NEG: persistent breast lump, skin dimpling or nipple discharge         Respiratory: negative  Cardiovascular: negative          Objective   BP 94/60   Wt  53.5 kg (118 lb)   LMP 03/16/2022 (Approximate)   Breastfeeding No   BMI 22.30 kg/m²     General:  well developed; well nourished  no acute distress   Skin:  No suspicious lesions seen   Thyroid: normal to inspection and palpation   Lungs:  breathing is unlabored  clear to auscultation bilaterally   Heart:  regular rate and rhythm, S1, S2 normal, no murmur, click, rub or gallop   Breasts:  Examined in supine position  Symmetric without masses or skin dimpling  Nipples normal without inversion, lesions or discharge  There are no palpable axillary nodes   Abdomen: soft, non-tender; no masses  no umbilical or inguinal hernias are present  no hepato-splenomegaly   Pelvis: Clinical staff was present for exam  External genitalia:  normal appearance of the external genitalia including Bartholin's and Achille's glands.  :  urethral meatus normal;  Vaginal:  normal pink mucosa without prolapse or lesions. discharge present -  yellow;  Cervix:  normal appearance.  Uterus:  normal size, shape and consistency.  Adnexa:  normal bimanual exam of the adnexa.  Rectal:  digital rectal exam not performed; anus visually normal appearing.     Psychiatric: Alert and oriented ×3, mood and affect appropriate  HEENT: Atraumatic, normocephalic, normal scleral icterus  Extremities: 2+ pulses bilaterally, no edema      Lab Review   No data reviewed    Imaging   No data reviewed        Assessment   1. Physical exam within normal limits.  2. Slight vaginal discharge     Plan   1. Pap smear done  Flagyl twice daily for 7 days  No orders of the defined types were placed in this encounter.         This note was electronically signed.      April 13, 2022

## 2022-04-29 DIAGNOSIS — Z01.419 ENCOUNTER FOR GYNECOLOGICAL EXAMINATION WITHOUT ABNORMAL FINDING: ICD-10-CM

## 2023-10-20 ENCOUNTER — OFFICE VISIT (OUTPATIENT)
Dept: OBSTETRICS AND GYNECOLOGY | Facility: CLINIC | Age: 23
End: 2023-10-20
Payer: COMMERCIAL

## 2023-10-20 VITALS
BODY MASS INDEX: 23.64 KG/M2 | WEIGHT: 125.2 LBS | SYSTOLIC BLOOD PRESSURE: 100 MMHG | DIASTOLIC BLOOD PRESSURE: 64 MMHG | HEIGHT: 61 IN

## 2023-10-20 DIAGNOSIS — N92.1 BREAKTHROUGH BLEEDING ON NEXPLANON: Primary | ICD-10-CM

## 2023-10-20 DIAGNOSIS — Z97.5 BREAKTHROUGH BLEEDING ON NEXPLANON: Primary | ICD-10-CM

## 2023-10-20 RX ORDER — AMOXICILLIN 500 MG/1
1000 CAPSULE ORAL 2 TIMES DAILY
COMMUNITY

## 2023-10-20 RX ORDER — ACETAMINOPHEN 325 MG/1
650 TABLET ORAL EVERY 6 HOURS PRN
COMMUNITY

## 2023-10-20 NOTE — PROGRESS NOTES
Subjective   Chief Complaint   Patient presents with    Ovarian Cyst     Patient here for evaluation of ovarian cyst and patient states she has been bleeding off and on for 2 months. TVS done today     Britt Dia is a 23 y.o. year old .  Patient's last menstrual period was 08/15/2023 (approximate).  She presents to be seen because of AUB/breakthrough bleeding with Nexplanon that been going on for multiple months.  Nexplanon was placed in .     OTHER COMPLAINTS:  Nothing else    The following portions of the patient's history were reviewed and updated as appropriate:She  has a past medical history of Chlamydia.  She does not have any pertinent problems on file.  She  has a past surgical history that includes Nose surgery and Mole removal (10/17/2023).  Her family history includes Cancer in her father.  She  reports that she has never smoked. She has never used smokeless tobacco. She reports that she does not drink alcohol and does not use drugs.  Current Outpatient Medications   Medication Sig Dispense Refill    acetaminophen (TYLENOL) 325 MG tablet Take 2 tablets by mouth Every 6 (Six) Hours As Needed for Mild Pain.      amoxicillin (AMOXIL) 500 MG capsule Take 2 capsules by mouth 2 (Two) Times a Day.      Nexplanon 68 MG implant subdermal implant        No current facility-administered medications for this visit.     Current Outpatient Medications on File Prior to Visit   Medication Sig    acetaminophen (TYLENOL) 325 MG tablet Take 2 tablets by mouth Every 6 (Six) Hours As Needed for Mild Pain.    amoxicillin (AMOXIL) 500 MG capsule Take 2 capsules by mouth 2 (Two) Times a Day.    Nexplanon 68 MG implant subdermal implant      No current facility-administered medications on file prior to visit.     She has No Known Allergies.    Social History    Tobacco Use      Smoking status: Never      Smokeless tobacco: Never      Tobacco comments: quit 4 years ago    Review of Systems  Consitutional POS:  "nothing reported    NEG: anorexia or night sweats   Gastointestinal POS: nothing reported    NEG: bloating, change in bowel habits, melena, or reflux symptoms   Genitourinary POS: nothing reported    NEG: dysuria or hematuria   Integument POS: nothing reported    NEG: moles that are changing in size, shape, color or rashes   Breast POS: nothing reported    NEG: persistent breast lump, skin dimpling, or nipple discharge         Respiratory: negative  Cardiovascular: negative  GYN:  negative          Objective   /64   Ht 154.9 cm (61\")   Wt 56.8 kg (125 lb 3.2 oz)   LMP 08/15/2023 (Approximate)   BMI 23.66 kg/m²     General:  well developed; well nourished  no acute distress   Skin:  No suspicious lesions seen   Thyroid: normal to inspection and palpation   Lungs:  breathing is unlabored  clear to auscultation bilaterally   Heart:  regular rate and rhythm, S1, S2 normal, no murmur, click, rub or gallop   Breasts:  Not performed.   Abdomen: soft, non-tender; no masses  no umbilical or inguinal hernias are present  no hepato-splenomegaly   Pelvis: Clinical staff was present for exam  External genitalia:  normal appearance of the external genitalia including Bartholin's and Willshire's glands.  :  urethral meatus normal;  Vaginal:  normal pink mucosa without prolapse or lesions.  Cervix:  normal appearance.  Uterus:  normal size, shape and consistency.  Adnexa:  normal bimanual exam of the adnexa.  Rectal:  digital rectal exam not performed; anus visually normal appearing.     Psychiatric: Alert and oriented ×3, mood and affect appropriate  HEENT: Atraumatic, normocephalic, normal scleral icterus  Extremities: 2+ pulses bilaterally, no edema      Lab Review   No data reviewed    Imaging   Uterus is anteverted normal size and shape.  Endometrium thin proximally 2 mm.  Normal adnexa with small follicles.  No solid masses.  No free fluid       Assessment   Breakthrough bleeding with Nexplanon     Plan   Would " recommend probably removal and changing to another option.  Options discussed.  Patient can think on let me know      No orders of the defined types were placed in this encounter.         This note was electronically signed.      October 20, 2023

## 2023-12-11 ENCOUNTER — OFFICE VISIT (OUTPATIENT)
Dept: OBSTETRICS AND GYNECOLOGY | Facility: CLINIC | Age: 23
End: 2023-12-11
Payer: COMMERCIAL

## 2023-12-11 VITALS
SYSTOLIC BLOOD PRESSURE: 110 MMHG | DIASTOLIC BLOOD PRESSURE: 68 MMHG | BODY MASS INDEX: 24.02 KG/M2 | WEIGHT: 127.2 LBS | HEIGHT: 61 IN

## 2023-12-11 DIAGNOSIS — Z30.46 NEXPLANON REMOVAL: Primary | ICD-10-CM

## 2023-12-11 DIAGNOSIS — Z30.013 ENCOUNTER FOR INITIAL PRESCRIPTION OF INJECTABLE CONTRACEPTIVE: ICD-10-CM

## 2023-12-11 PROCEDURE — 99212 OFFICE O/P EST SF 10 MIN: CPT | Performed by: PHYSICIAN ASSISTANT

## 2023-12-11 PROCEDURE — 1159F MED LIST DOCD IN RCRD: CPT | Performed by: PHYSICIAN ASSISTANT

## 2023-12-11 PROCEDURE — 11982 REMOVE DRUG IMPLANT DEVICE: CPT | Performed by: PHYSICIAN ASSISTANT

## 2023-12-11 PROCEDURE — 1160F RVW MEDS BY RX/DR IN RCRD: CPT | Performed by: PHYSICIAN ASSISTANT

## 2023-12-11 RX ORDER — MEDROXYPROGESTERONE ACETATE 150 MG/ML
150 INJECTION, SUSPENSION INTRAMUSCULAR
Qty: 1 ML | Refills: 3 | Status: SHIPPED | OUTPATIENT
Start: 2023-12-11

## 2023-12-11 NOTE — PROGRESS NOTES
Nexplanon Removal    Date of procedure:  12/11/2023    Risks and benefits discussed? yes  All questions answered? yes  Consents given by the patient  Written consent obtained? yes  Reason for removal: Side effect: bleeding    Local anesthesia used:  yes - 1.0 cc's of local anesthesia: 1% lidocaine with epinephrine    Procedure documentation:    The upper right arm was marked at the intended site of removal.  Betadine was used to cleanse the skin.  Local anesthesia was injected.  A vertical incision was created at the distal tip of the implant.  The implant was removed intact without difficulty.  Steri-strips were then placed across the site of insertion and the arm was wrapped.    She tolerated the procedure well.  There were no complications.  EBL was minimal.    Post procedure instructions: Remove the wrapping in 24 hours and the steri-strips in 5 days.    Follow up needed: PRN    This note was electronically signed.    Susie Regan PA-C.  December 11, 2023

## 2023-12-11 NOTE — PROGRESS NOTES
"Subjective   Chief Complaint   Patient presents with    in office procedure     Nexplanon removal, requesting to switch to Depo Provera       Britt Dia is a 23 y.o. year old  presenting to be seen for Nexplanon removal.  Nexplanon placed about 2 years ago. Having unpredictable bleeding. Had discussed recently with Dr. Echols. Wants to get Nexplanon removed and start DMPA       Past Medical History:   Diagnosis Date    Chlamydia     Multiple gestation     Ovarian cyst     Spinal headache     Urinary tract infection         Current Outpatient Medications:     acetaminophen (TYLENOL) 325 MG tablet, Take 2 tablets by mouth Every 6 (Six) Hours As Needed for Mild Pain., Disp: , Rfl:     medroxyPROGESTERone (Depo-Provera) 150 MG/ML injection, Inject 1 mL into the appropriate muscle as directed by prescriber Every 3 (Three) Months., Disp: 1 mL, Rfl: 3   No Known Allergies   Past Surgical History:   Procedure Laterality Date    MOLE REMOVAL  10/17/2023    back    NOSE SURGERY        Social History     Socioeconomic History    Marital status: Single    Number of children: 1    Years of education: 0    Highest education level: 12th grade   Tobacco Use    Smoking status: Never    Smokeless tobacco: Never    Tobacco comments:     quit 4 years ago   Vaping Use    Vaping Use: Former   Substance and Sexual Activity    Alcohol use: Never    Drug use: Never    Sexual activity: Yes     Partners: Male     Birth control/protection: Nexplanon      Family History   Problem Relation Age of Onset    Cancer Father        Review of Systems        Objective   /68   Ht 154.9 cm (61\")   Wt 57.7 kg (127 lb 3.2 oz)   LMP 11/15/2023 (Exact Date)   BMI 24.03 kg/m²     Physical Exam       Result Review :                   Assessment and Plan  Diagnoses and all orders for this visit:    1. Nexplanon removal (Primary)    2. Encounter for initial prescription of injectable contraceptive    Other orders  -     medroxyPROGESTERone " (Depo-Provera) 150 MG/ML injection; Inject 1 mL into the appropriate muscle as directed by prescriber Every 3 (Three) Months.  Dispense: 1 mL; Refill: 3      Patient Instructions   May return for DMPA injection today            This note was electronically signed.    Susie Regan PA-C   December 11, 2023

## 2024-03-08 ENCOUNTER — CLINICAL SUPPORT (OUTPATIENT)
Dept: OBSTETRICS AND GYNECOLOGY | Facility: CLINIC | Age: 24
End: 2024-03-08
Payer: COMMERCIAL

## 2024-03-08 VITALS — HEIGHT: 61 IN | WEIGHT: 127 LBS | BODY MASS INDEX: 23.98 KG/M2

## 2024-03-08 DIAGNOSIS — Z30.42 ENCOUNTER FOR SURVEILLANCE OF INJECTABLE CONTRACEPTIVE: Primary | ICD-10-CM

## 2024-03-08 RX ORDER — MEDROXYPROGESTERONE ACETATE 150 MG/ML
150 INJECTION, SUSPENSION INTRAMUSCULAR
Status: SHIPPED | OUTPATIENT
Start: 2024-03-08 | End: 2025-03-03

## 2024-03-08 RX ADMIN — MEDROXYPROGESTERONE ACETATE 150 MG: 150 INJECTION, SUSPENSION INTRAMUSCULAR at 12:48

## 2024-05-30 ENCOUNTER — CLINICAL SUPPORT (OUTPATIENT)
Dept: OBSTETRICS AND GYNECOLOGY | Facility: CLINIC | Age: 24
End: 2024-05-30
Payer: COMMERCIAL

## 2024-05-30 VITALS — SYSTOLIC BLOOD PRESSURE: 110 MMHG | DIASTOLIC BLOOD PRESSURE: 68 MMHG | WEIGHT: 127 LBS | BODY MASS INDEX: 24 KG/M2

## 2024-05-30 DIAGNOSIS — Z30.42 ENCOUNTER FOR SURVEILLANCE OF INJECTABLE CONTRACEPTIVE: Primary | ICD-10-CM

## 2024-05-30 RX ADMIN — MEDROXYPROGESTERONE ACETATE 150 MG: 150 INJECTION, SUSPENSION INTRAMUSCULAR at 14:40

## 2024-07-09 ENCOUNTER — OFFICE VISIT (OUTPATIENT)
Dept: UROLOGY | Facility: CLINIC | Age: 24
End: 2024-07-09
Payer: COMMERCIAL

## 2024-07-09 ENCOUNTER — LAB (OUTPATIENT)
Dept: LAB | Facility: HOSPITAL | Age: 24
End: 2024-07-09
Payer: COMMERCIAL

## 2024-07-09 VITALS
WEIGHT: 131 LBS | HEART RATE: 64 BPM | BODY MASS INDEX: 24.73 KG/M2 | HEIGHT: 61 IN | RESPIRATION RATE: 14 BRPM | SYSTOLIC BLOOD PRESSURE: 118 MMHG | TEMPERATURE: 97.9 F | DIASTOLIC BLOOD PRESSURE: 82 MMHG

## 2024-07-09 DIAGNOSIS — N92.6 MISSED PERIOD: ICD-10-CM

## 2024-07-09 DIAGNOSIS — R35.1 NOCTURIA: Primary | ICD-10-CM

## 2024-07-09 DIAGNOSIS — R35.1 NOCTURIA: ICD-10-CM

## 2024-07-09 DIAGNOSIS — N32.81 OAB (OVERACTIVE BLADDER): ICD-10-CM

## 2024-07-09 LAB
B-HCG UR QL: NEGATIVE
BILIRUB BLD-MCNC: NEGATIVE MG/DL
CLARITY, POC: CLEAR
COLOR UR: YELLOW
EXPIRATION DATE: ABNORMAL
GLUCOSE UR STRIP-MCNC: ABNORMAL MG/DL
KETONES UR QL: NEGATIVE
LEUKOCYTE EST, POC: NEGATIVE
Lab: ABNORMAL
NITRITE UR-MCNC: NEGATIVE MG/ML
PH UR: 6 [PH] (ref 5–8)
PROT UR STRIP-MCNC: NEGATIVE MG/DL
RBC # UR STRIP: NEGATIVE /UL
SP GR UR: 1.01 (ref 1–1.03)
UROBILINOGEN UR QL: ABNORMAL

## 2024-07-09 PROCEDURE — 84588 ASSAY OF VASOPRESSIN: CPT

## 2024-07-09 PROCEDURE — 83036 HEMOGLOBIN GLYCOSYLATED A1C: CPT

## 2024-07-09 PROCEDURE — 36415 COLL VENOUS BLD VENIPUNCTURE: CPT

## 2024-07-09 PROCEDURE — 81025 URINE PREGNANCY TEST: CPT

## 2024-07-09 RX ORDER — OXYBUTYNIN CHLORIDE 10 MG/1
10 TABLET, EXTENDED RELEASE ORAL DAILY
Qty: 30 TABLET | Refills: 3 | Status: SHIPPED | OUTPATIENT
Start: 2024-07-09

## 2024-07-09 NOTE — PROGRESS NOTES
Chief Complaint  Chief Complaint   Patient presents with    Urinary Tract Infection    Nocturia     New paitent          HPI  Ms. Dia is a 24 y.o. female presents with complaint of nocturia x 6-7 months  She is concerned she is pregnant she does not have periods because she on Depo provera injections but just feels she may be pregnant.      Has not previously seen urology.    Pt has primarily nocturia  Drinks 2 tumblers of water daily, drinks 1 can of soda daily, and 1 cup of coffee in the morning     Severity: Pt uses 0 pads a day and has tried stopping fluids 1 hour prior to bed in the past, saw some mild improvement   Associated Sx: Pt has + h/o daytime frequency urinates 3-4 times, urgency occasionally and nocturia x 6-10 times if she doesn't get up at night when she feels the urge her lower back and stomach will hurt badly      No h/o poor stream, straining occasionally, hesitancy or incomplete voiding     No h/o recurrent UTI, hematuria, nephrolithiasis    No vaginal discharge or bleeding  No sensation of POP  No      Fecal incontinence  Yes Post-void dribbling  Yes Dyspareunia in the past but not currently  No Constipation has daily BM  2 Vaginal deliveries    Past Medical History  Past Medical History:   Diagnosis Date    Chlamydia     Multiple gestation     Ovarian cyst     Spinal headache     Urinary tract infection        Past Surgical History  Past Surgical History:   Procedure Laterality Date    MOLE REMOVAL  10/17/2023    back    NOSE SURGERY         Medications  Current Outpatient Medications   Medication Sig Dispense Refill    acetaminophen (TYLENOL) 325 MG tablet Take 2 tablets by mouth Every 6 (Six) Hours As Needed for Mild Pain. (Patient not taking: Reported on 7/9/2024)      medroxyPROGESTERone (Depo-Provera) 150 MG/ML injection Inject 1 mL into the appropriate muscle as directed by prescriber Every 3 (Three) Months. (Patient not taking: Reported on 7/9/2024) 1 mL 3    oxybutynin XL (Ditropan  "XL) 10 MG 24 hr tablet Take 1 tablet by mouth Daily. 30 tablet 3     Current Facility-Administered Medications   Medication Dose Route Frequency Provider Last Rate Last Admin    medroxyPROGESTERone (DEPO-PROVERA) injection 150 mg  150 mg Intramuscular Q3 Months Teresa Puentes MD   150 mg at 05/30/24 1440       Allergies  No Known Allergies    Social History  Social History     Socioeconomic History    Marital status: Single    Number of children: 1    Years of education: 0    Highest education level: 12th grade   Tobacco Use    Smoking status: Never     Passive exposure: Current    Smokeless tobacco: Never    Tobacco comments:     quit 4 years ago   Vaping Use    Vaping status: Former   Substance and Sexual Activity    Alcohol use: Never    Drug use: Never    Sexual activity: Yes     Partners: Male     Birth control/protection: Nexplanon       Family History  Family History   Problem Relation Age of Onset    Cancer Father          Physical Exam  Visit Vitals  /82 (BP Location: Right arm, Patient Position: Sitting, Cuff Size: Adult)   Pulse 64   Temp 97.9 °F (36.6 °C) (Temporal)   Resp 14   Ht 154.9 cm (60.98\")   Wt 59.4 kg (131 lb)   BMI 24.77 kg/m²     Physical Exam  Vitals and nursing note reviewed. Exam conducted with a chaperone present.   Constitutional:       General: She is not in acute distress.     Appearance: Normal appearance. She is normal weight. She is not ill-appearing.   Pulmonary:      Effort: Pulmonary effort is normal. No respiratory distress.   Genitourinary:     General: Normal vulva.      Vagina: Normal.      Comments: Urethra normal, no swelling, or masses   Skin:     General: Skin is warm and dry.   Neurological:      General: No focal deficit present.      Mental Status: She is alert and oriented to person, place, and time.   Psychiatric:         Mood and Affect: Mood normal.         Behavior: Behavior normal.          Labs  Brief Urine Lab Results  (Last result in the past 365 " days)        Color   Clarity   Blood   Leuk Est   Nitrite   Protein   CREAT   Urine HCG        07/09/24 1503               Negative               Lab Results   Component Value Date    GLUCOSE 99 04/11/2021    CALCIUM 9.6 04/11/2021     04/11/2021    K 3.4 (L) 04/11/2021    CO2 24.7 04/11/2021     04/11/2021    BUN 8 04/11/2021    CREATININE <0.47 (L) 04/11/2021    BCR  04/11/2021      Comment:      Unable to calculate Bun/Crea Ratio.    ANIONGAP 7.3 04/11/2021       Lab Results   Component Value Date    WBC 14.55 (H) 10/15/2021    HGB 10.8 (L) 10/15/2021    HCT 33.5 (L) 10/15/2021    MCV 81.5 10/15/2021     10/15/2021       PVR  Post-void residual performed by staff - 0ml    I have personally reviewed her labs and post void residual imaging.     Assessment    Diagnoses and all orders for this visit:    1. Nocturia (Primary)  -     POC Urinalysis Dipstick, Automated  -     oxybutynin XL (Ditropan XL) 10 MG 24 hr tablet; Take 1 tablet by mouth Daily.  Dispense: 30 tablet; Refill: 3  -     ADH; Future  -     Hemoglobin A1c; Future    2. OAB (overactive bladder)  -     oxybutynin XL (Ditropan XL) 10 MG 24 hr tablet; Take 1 tablet by mouth Daily.  Dispense: 30 tablet; Refill: 3  -     Ambulatory Referral to Physical Therapy for Evaluation & Treatment    3. Missed period  -     Pregnancy, Urine - Urine, Clean Catch; Future         Ms. Dia is a 24 y.o. female with nocturia, occasional daytime frequency and urgency. She also has some occasional DESIRE. We discussed treatment including conservative management with PFPT, dietary modifications, decreasing fluid intake prior to bedtime, and OAB medications.  We discussed anticholinergics versus beta agonist we will have patient start oxybutynin as this is covered with insurance we reviewed side effects including dry mouth, dry eye, constipation.  Encourage patient to increase fluid intake to 64 ounces of water daily.    Plan  1. Start oxybutynin XL 10 mg  2.   Stop fluids 2 hours prior to bedtime  3.  Obtain urine hCG, ADH and hemoglobin A1c today  4.  Ordered PFPT at [address] physiotherapy    RODNEY Trivedi, NP-C  [facility] [place]

## 2024-07-10 LAB — HBA1C MFR BLD: 5.2 % (ref 4.8–5.6)

## 2024-07-17 LAB — VASOPRESSIN SERPL-MCNC: <0.8 PG/ML (ref 0–4.7)

## 2024-08-23 ENCOUNTER — CLINICAL SUPPORT (OUTPATIENT)
Dept: OBSTETRICS AND GYNECOLOGY | Facility: CLINIC | Age: 24
End: 2024-08-23
Payer: COMMERCIAL

## 2024-08-23 VITALS — HEIGHT: 61 IN | WEIGHT: 131 LBS | BODY MASS INDEX: 24.73 KG/M2

## 2024-08-23 DIAGNOSIS — Z30.42 ENCOUNTER FOR SURVEILLANCE OF INJECTABLE CONTRACEPTIVE: Primary | ICD-10-CM

## 2024-08-23 RX ADMIN — MEDROXYPROGESTERONE ACETATE 150 MG: 150 INJECTION, SUSPENSION INTRAMUSCULAR at 12:58
